# Patient Record
Sex: MALE | Race: WHITE | NOT HISPANIC OR LATINO
[De-identification: names, ages, dates, MRNs, and addresses within clinical notes are randomized per-mention and may not be internally consistent; named-entity substitution may affect disease eponyms.]

---

## 2017-04-23 ENCOUNTER — RESULT REVIEW (OUTPATIENT)
Age: 62
End: 2017-04-23

## 2017-06-29 ENCOUNTER — INPATIENT (INPATIENT)
Facility: HOSPITAL | Age: 62
LOS: 1 days | Discharge: HOME | DRG: 247 | End: 2017-07-01
Attending: INTERNAL MEDICINE | Admitting: INTERNAL MEDICINE
Payer: COMMERCIAL

## 2017-06-29 ENCOUNTER — TRANSCRIPTION ENCOUNTER (OUTPATIENT)
Age: 62
End: 2017-06-29

## 2017-06-29 VITALS
WEIGHT: 214.95 LBS | SYSTOLIC BLOOD PRESSURE: 142 MMHG | OXYGEN SATURATION: 98 % | DIASTOLIC BLOOD PRESSURE: 69 MMHG | TEMPERATURE: 98 F | HEIGHT: 69 IN | RESPIRATION RATE: 18 BRPM | HEART RATE: 63 BPM

## 2017-06-29 DIAGNOSIS — R07.89 OTHER CHEST PAIN: ICD-10-CM

## 2017-06-29 DIAGNOSIS — Z95.1 PRESENCE OF AORTOCORONARY BYPASS GRAFT: Chronic | ICD-10-CM

## 2017-06-29 PROCEDURE — 93010 ELECTROCARDIOGRAM REPORT: CPT

## 2017-06-29 PROCEDURE — 93567 NJX CAR CTH SPRVLV AORTGRPHY: CPT | Mod: GC

## 2017-06-29 PROCEDURE — 93459 L HRT ART/GRFT ANGIO: CPT | Mod: 26,59,GC

## 2017-06-29 PROCEDURE — 92928 PRQ TCAT PLMT NTRAC ST 1 LES: CPT | Mod: RC,GC

## 2017-06-29 RX ORDER — FOLIC ACID 0.8 MG
1 TABLET ORAL DAILY
Qty: 0 | Refills: 0 | Status: DISCONTINUED | OUTPATIENT
Start: 2017-06-29 | End: 2017-07-01

## 2017-06-29 RX ORDER — SODIUM CHLORIDE 9 MG/ML
3 INJECTION INTRAMUSCULAR; INTRAVENOUS; SUBCUTANEOUS EVERY 8 HOURS
Qty: 0 | Refills: 0 | Status: DISCONTINUED | OUTPATIENT
Start: 2017-06-29 | End: 2017-07-01

## 2017-06-29 RX ORDER — ASPIRIN/CALCIUM CARB/MAGNESIUM 324 MG
81 TABLET ORAL DAILY
Qty: 0 | Refills: 0 | Status: DISCONTINUED | OUTPATIENT
Start: 2017-06-29 | End: 2017-07-01

## 2017-06-29 RX ORDER — CLOPIDOGREL BISULFATE 75 MG/1
75 TABLET, FILM COATED ORAL DAILY
Qty: 0 | Refills: 0 | Status: DISCONTINUED | OUTPATIENT
Start: 2017-06-29 | End: 2017-07-01

## 2017-06-29 RX ORDER — METOPROLOL TARTRATE 50 MG
1 TABLET ORAL
Qty: 0 | Refills: 0 | COMMUNITY

## 2017-06-29 RX ORDER — METOPROLOL TARTRATE 50 MG
25 TABLET ORAL DAILY
Qty: 0 | Refills: 0 | Status: DISCONTINUED | OUTPATIENT
Start: 2017-06-29 | End: 2017-06-29

## 2017-06-29 RX ORDER — ATORVASTATIN CALCIUM 80 MG/1
80 TABLET, FILM COATED ORAL AT BEDTIME
Qty: 0 | Refills: 0 | Status: DISCONTINUED | OUTPATIENT
Start: 2017-06-29 | End: 2017-07-01

## 2017-06-29 RX ORDER — CLONAZEPAM 1 MG
0.5 TABLET ORAL EVERY 12 HOURS
Qty: 0 | Refills: 0 | Status: DISCONTINUED | OUTPATIENT
Start: 2017-06-29 | End: 2017-07-01

## 2017-06-29 RX ORDER — METOPROLOL TARTRATE 50 MG
25 TABLET ORAL DAILY
Qty: 0 | Refills: 0 | Status: DISCONTINUED | OUTPATIENT
Start: 2017-06-29 | End: 2017-07-01

## 2017-06-29 RX ORDER — ACETAMINOPHEN 500 MG
650 TABLET ORAL EVERY 6 HOURS
Qty: 0 | Refills: 0 | Status: DISCONTINUED | OUTPATIENT
Start: 2017-06-29 | End: 2017-07-01

## 2017-06-29 RX ORDER — TAMSULOSIN HYDROCHLORIDE 0.4 MG/1
0.4 CAPSULE ORAL AT BEDTIME
Qty: 0 | Refills: 0 | Status: DISCONTINUED | OUTPATIENT
Start: 2017-06-29 | End: 2017-07-01

## 2017-06-29 RX ADMIN — TAMSULOSIN HYDROCHLORIDE 0.4 MILLIGRAM(S): 0.4 CAPSULE ORAL at 21:22

## 2017-06-29 RX ADMIN — ATORVASTATIN CALCIUM 80 MILLIGRAM(S): 80 TABLET, FILM COATED ORAL at 21:22

## 2017-06-29 RX ADMIN — Medication 0.5 MILLIGRAM(S): at 21:22

## 2017-06-29 RX ADMIN — SODIUM CHLORIDE 3 MILLILITER(S): 9 INJECTION INTRAMUSCULAR; INTRAVENOUS; SUBCUTANEOUS at 21:22

## 2017-06-29 NOTE — DISCHARGE NOTE ADULT - CARE PLAN
Principal Discharge DX:	Coronary artery disease due to lipid rich plaque  Goal:	Pt remains chest pain free and understands post cath discharge instructions  Instructions for follow-up, activity and diet:	Do not stop you Aspirin or Plavix unless instructed to do so by your cardiologist.   No heavy lifting, strenuous activity, bending, straining, or unnecessary stair climbing for 2 weeks. No driving for 2 days. You may shower 24 hours following the procedure but avoid baths/swimming for 1 week. Check your groin site for bleeding and/or swelling daily following procedure and call your doctor immediately if it occurs or if you experience increased pain at the site. Follow up with your cardiologist in 1-2 weeks. You may call [facility] if you have any questions/concerns regarding your procedure [phone].   Lifestyle modification: include healthy habits to prevent stroke and future CAD  Low salt, low fat diet.   Weight management.   Take medications as prescribed.    No smoking.  Follow up with your cardiologist or primary doctor in 1- 2 weeks. Principal Discharge DX:	Coronary artery disease due to lipid rich plaque  Goal:	Pt remains chest pain free and understands post cath discharge instructions  Instructions for follow-up, activity and diet:	Do not stop you Aspirin or Plavix unless instructed to do so by your cardiologist.   No heavy lifting, strenuous activity, bending, straining, or unnecessary stair climbing for 2 weeks. No driving for 2 days. You may shower 24 hours following the procedure but avoid baths/swimming for 1 week. Check your groin site for bleeding and/or swelling daily following procedure and call your doctor immediately if it occurs or if you experience increased pain at the site. Follow up with your cardiologist in 1-2 weeks. You may call [facility] if you have any questions/concerns regarding your procedure [phone].   Lifestyle modification: include healthy habits to prevent stroke and future CAD  Low salt, low fat diet.   Weight management.   Take medications as prescribed.    No smoking.  Follow up with your cardiologist or primary doctor in 1- 2 weeks. Principal Discharge DX:	Coronary artery disease due to lipid rich plaque  Goal:	Pt remains chest pain free and understands post cath discharge instructions  Instructions for follow-up, activity and diet:	Do not stop you Aspirin or Plavix unless instructed to do so by your cardiologist.   No heavy lifting, strenuous activity, bending, straining, or unnecessary stair climbing for 2 weeks. No driving for 2 days. You may shower 24 hours following the procedure but avoid baths/swimming for 1 week. Check your groin site for bleeding and/or swelling daily following procedure and call your doctor immediately if it occurs or if you experience increased pain at the site. Follow up with your cardiologist in 1-2 weeks. You may call [facility] if you have any questions/concerns regarding your procedure [phone].   Lifestyle modification: include healthy habits to prevent stroke and future CAD  Low salt, low fat diet.   Weight management.   Take medications as prescribed.    No smoking.  Follow up with your cardiologist or primary doctor in 1- 2 weeks. Principal Discharge DX:	Coronary artery disease due to lipid rich plaque  Goal:	Pt remains chest pain free and understands post cath discharge instructions  Instructions for follow-up, activity and diet:	Do not stop you Aspirin or Plavix unless instructed to do so by your cardiologist.   No heavy lifting, strenuous activity, bending, straining, or unnecessary stair climbing for 2 weeks. No driving for 2 days. You may shower 24 hours following the procedure but avoid baths/swimming for 1 week. Check your groin site for bleeding and/or swelling daily following procedure and call your doctor immediately if it occurs or if you experience increased pain at the site. Follow up with your cardiologist in 1-2 weeks. You may call [facility] if you have any questions/concerns regarding your procedure [phone].   Lifestyle modification: include healthy habits to prevent stroke and future CAD  Low salt, low fat diet.   Weight management.   Take medications as prescribed.    No smoking.  Follow up with your cardiologist or primary doctor in 1- 2 weeks.  Secondary Diagnosis:	HLD (hyperlipidemia)  Goal:	Your LDL cholesterol will be less than 70mg/dL  Instructions for follow-up, activity and diet:	Continue with your cholesterol medications. Eat a heart healthy diet that is low in saturated fats and salt, and includes whole grains, fruits, vegetables and lean protein; exercise regularly (consult with your physician or cardiologist first); maintain a heart healthy weight. Continue to follow with your primary physician or cardiologist for treatment goals, continue medication, have liver function testing every 3 months as anti lipid medications can cause liver irritation. If you smoke - quit (A resource to help you stop smoking is the [facility] – phone number [phone].). Principal Discharge DX:	Coronary artery disease due to lipid rich plaque  Goal:	Pt remains chest pain free and understands post cath discharge instructions  Instructions for follow-up, activity and diet:	Do not stop you Aspirin or Plavix unless instructed to do so by your cardiologist.   No heavy lifting, strenuous activity, bending, straining, or unnecessary stair climbing for 2 weeks. No driving for 2 days. You may shower 24 hours following the procedure but avoid baths/swimming for 1 week. Check your groin site for bleeding and/or swelling daily following procedure and call your doctor immediately if it occurs or if you experience increased pain at the site. Follow up with your cardiologist in 1-2 weeks. You may call [facility] if you have any questions/concerns regarding your procedure [phone].   Lifestyle modification: include healthy habits to prevent stroke and future CAD  Low salt, low fat diet.   Weight management.   Take medications as prescribed.    No smoking.  Follow up with your cardiologist or primary doctor in 1- 2 weeks.  Secondary Diagnosis:	HLD (hyperlipidemia)  Goal:	Your LDL cholesterol will be less than 70mg/dL  Instructions for follow-up, activity and diet:	Continue with your cholesterol medications. Eat a heart healthy diet that is low in saturated fats and salt, and includes whole grains, fruits, vegetables and lean protein; exercise regularly (consult with your physician or cardiologist first); maintain a heart healthy weight. Continue to follow with your primary physician or cardiologist for treatment goals, continue medication, have liver function testing every 3 months as anti lipid medications can cause liver irritation. If you smoke - quit (A resource to help you stop smoking is the [facility] – phone number [phone].). Principal Discharge DX:	Coronary artery disease due to lipid rich plaque  Goal:	Pt remains chest pain free and understands post cath discharge instructions  Instructions for follow-up, activity and diet:	Do not stop you Aspirin or Plavix unless instructed to do so by your cardiologist.   No heavy lifting, strenuous activity, bending, straining, or unnecessary stair climbing for 2 weeks. No driving for 2 days. You may shower 24 hours following the procedure but avoid baths/swimming for 1 week. Check your groin site for bleeding and/or swelling daily following procedure and call your doctor immediately if it occurs or if you experience increased pain at the site. Follow up with your cardiologist in 1-2 weeks. You may call [facility] if you have any questions/concerns regarding your procedure [phone].   Lifestyle modification: include healthy habits to prevent stroke and future CAD  Low salt, low fat diet.   Weight management.   Take medications as prescribed.    No smoking.  Follow up with your cardiologist or primary doctor in 1- 2 weeks.  Secondary Diagnosis:	HLD (hyperlipidemia)  Goal:	Your LDL cholesterol will be less than 70mg/dL  Instructions for follow-up, activity and diet:	Continue with your cholesterol medications. Eat a heart healthy diet that is low in saturated fats and salt, and includes whole grains, fruits, vegetables and lean protein; exercise regularly (consult with your physician or cardiologist first); maintain a heart healthy weight. Continue to follow with your primary physician or cardiologist for treatment goals, continue medication, have liver function testing every 3 months as anti lipid medications can cause liver irritation. If you smoke - quit (A resource to help you stop smoking is the [facility] – phone number [phone].). Principal Discharge DX:	Coronary artery disease due to lipid rich plaque  Goal:	Pt remains chest pain free and understands post cath discharge instructions  Instructions for follow-up, activity and diet:	Do not stop you Aspirin or Plavix unless instructed to do so by your cardiologist.   No heavy lifting, strenuous activity, bending, straining, or unnecessary stair climbing for 2 weeks. No driving for 2 days. You may shower 24 hours following the procedure but avoid baths/swimming for 1 week. Check your groin site for bleeding and/or swelling daily following procedure and call your doctor immediately if it occurs or if you experience increased pain at the site. Follow up with your cardiologist in 1-2 weeks. You may call [facility] if you have any questions/concerns regarding your procedure [phone].   Lifestyle modification: include healthy habits to prevent stroke and future CAD  Low salt, low fat diet.   Weight management.   Take medications as prescribed.    No smoking.  Follow up with your cardiologist or primary doctor in 1- 2 weeks.  Secondary Diagnosis:	HLD (hyperlipidemia)  Goal:	Your LDL cholesterol will be less than 70mg/dL  Instructions for follow-up, activity and diet:	Continue with your cholesterol medications. Eat a heart healthy diet that is low in saturated fats and salt, and includes whole grains, fruits, vegetables and lean protein; exercise regularly (consult with your physician or cardiologist first); maintain a heart healthy weight. Continue to follow with your primary physician or cardiologist for treatment goals, continue medication, have liver function testing every 3 months as anti lipid medications can cause liver irritation. If you smoke - quit (A resource to help you stop smoking is the [facility] – phone number [phone].). Principal Discharge DX:	Coronary artery disease due to lipid rich plaque  Goal:	Pt remains chest pain free and understands post cath discharge instructions  Instructions for follow-up, activity and diet:	Do not stop you Aspirin or Plavix unless instructed to do so by your cardiologist.   No heavy lifting, strenuous activity, bending, straining, or unnecessary stair climbing for 2 weeks. No driving for 2 days. You may shower 24 hours following the procedure but avoid baths/swimming for 1 week. Check your groin site for bleeding and/or swelling daily following procedure and call your doctor immediately if it occurs or if you experience increased pain at the site. Follow up with your cardiologist in 1-2 weeks. You may call [facility] if you have any questions/concerns regarding your procedure [phone].   Lifestyle modification: include healthy habits to prevent stroke and future CAD  Low salt, low fat diet.   Weight management.   Take medications as prescribed.    No smoking.  Follow up with your cardiologist or primary doctor in 1- 2 weeks.  Secondary Diagnosis:	HLD (hyperlipidemia)  Goal:	Your LDL cholesterol will be less than 70mg/dL  Instructions for follow-up, activity and diet:	Continue with your cholesterol medications. Eat a heart healthy diet that is low in saturated fats and salt, and includes whole grains, fruits, vegetables and lean protein; exercise regularly (consult with your physician or cardiologist first); maintain a heart healthy weight. Continue to follow with your primary physician or cardiologist for treatment goals, continue medication, have liver function testing every 3 months as anti lipid medications can cause liver irritation. If you smoke - quit (A resource to help you stop smoking is the [facility] – phone number [phone].).

## 2017-06-29 NOTE — CHART NOTE - NSCHARTNOTEFT_GEN_A_CORE
Removal of Femoral Sheath    Pulses in the (right) lower extremity are (palpable/audible by doppler/absent). The patient was placed in the supine position. The insertion site was identified and the sutures were removed per protocol.  The 6 [place] femoral sheath was then removed. Direct pressure was applied for  _20_____ minutes.     Monitoring of the (right) groin and both lower extremities including neuro-vascular checks and vital signs every 15 minutes x 4, then every 30 minutes x 2, then every 1 hour was ordered.    Complications: none    Comments:    Addendum:

## 2017-06-29 NOTE — DISCHARGE NOTE ADULT - PLAN OF CARE
Pt remains chest pain free and understands post cath discharge instructions Do not stop you Aspirin or Plavix unless instructed to do so by your cardiologist.   No heavy lifting, strenuous activity, bending, straining, or unnecessary stair climbing for 2 weeks. No driving for 2 days. You may shower 24 hours following the procedure but avoid baths/swimming for 1 week. Check your groin site for bleeding and/or swelling daily following procedure and call your doctor immediately if it occurs or if you experience increased pain at the site. Follow up with your cardiologist in 1-2 weeks. You may call [facility] if you have any questions/concerns regarding your procedure [phone].   Lifestyle modification: include healthy habits to prevent stroke and future CAD  Low salt, low fat diet.   Weight management.   Take medications as prescribed.    No smoking.  Follow up with your cardiologist or primary doctor in 1- 2 weeks. Your LDL cholesterol will be less than 70mg/dL Continue with your cholesterol medications. Eat a heart healthy diet that is low in saturated fats and salt, and includes whole grains, fruits, vegetables and lean protein; exercise regularly (consult with your physician or cardiologist first); maintain a heart healthy weight. Continue to follow with your primary physician or cardiologist for treatment goals, continue medication, have liver function testing every 3 months as anti lipid medications can cause liver irritation. If you smoke - quit (A resource to help you stop smoking is the [facility] – phone number [phone].).

## 2017-06-29 NOTE — H&P CARDIOLOGY - HISTORY OF PRESENT ILLNESS
62 year old male pt with PMHX of CAD with stent, 5V CABG (2007) at [facility], MARTA not on machine presented to [facility] ER on 6/28 evening c/o 3/10 left sided chest pain radiated to left scapula. pt states he worked 2 days of gardening and errand for his car and came home around 7pm started feeling pain. Pt had elevated CPK (397) but had negative Troponin x3 with no EKG changes, ASA, Plavix given, now transferred here for cardiac cath. Pt denies chest pain or SOB currently.

## 2017-06-29 NOTE — DISCHARGE NOTE ADULT - ADDITIONAL INSTRUCTIONS
No heavy lifting or pushing/pulling with procedure arm for 2 weeks. No driving for 2 days. You may shower 24 hours following the procedure but avoid baths/swimming for 1 week. Check your wrist site for bleeding and/or swelling daily following procedure and call your doctor immediately if it occurs or if you experience increased pain at the site. Follow up with your cardiologist in 1-2 weeks. You may call [facility] if you have any questions/concerns regarding your procedure [phone]. DO NOT STOP ASPIRIN OR PLAVIX UNLESS INSTRUCTED BY CARDIOLOGIST.

## 2017-06-29 NOTE — DISCHARGE NOTE ADULT - PATIENT PORTAL LINK FT
“You can access the FollowHealth Patient Portal, offered by [facility], by registering with the following website: [email or web address]”

## 2017-06-29 NOTE — DISCHARGE NOTE ADULT - MEDICATION SUMMARY - MEDICATIONS TO TAKE
I will START or STAY ON the medications listed below when I get home from the hospital:    aspirin 81 mg oral tablet  -- 1 tab(s) by mouth once a day  -- Indication: For CAD (coronary artery disease)    tamsulosin 0.4 mg oral capsule  -- 1 cap(s) by mouth once a day  -- Indication: For Home med    clonazePAM 0.5 mg oral tablet  -- 1 tab(s) by mouth 2 times a day  -- Indication: For Home med    Zetia 10 mg oral tablet  -- 1 tab(s) by mouth once a day  -- Indication: For HLD (hyperlipidemia)    pravastatin 40 mg oral tablet  -- 1 tab(s) by mouth once a day  -- Indication: For HLD (hyperlipidemia)    clopidogrel 75 mg oral tablet  -- 1 tab(s) by mouth once a day  -- Indication: For CAD (coronary artery disease)    Toprol-XL 25 mg oral tablet, extended release  -- 1 tab(s) by mouth once a day  -- Indication: For Htn    CoQ10 300 mg oral capsule  -- 1 cap(s) by mouth once a day  -- Indication: For Home med    Fish Oil 1000 mg oral capsule  -- 1 cap(s) by mouth once a day  -- Indication: For Home med    folic acid 1 mg oral tablet  -- 1 tab(s) by mouth once a day  -- Indication: For Home med I will START or STAY ON the medications listed below when I get home from the hospital:    aspirin 81 mg oral tablet  -- 1 tab(s) by mouth once a day  -- Indication: For To prevent stent from closing     tamsulosin 0.4 mg oral capsule  -- 1 cap(s) by mouth once a day  -- Indication: For Home med    clonazePAM 0.5 mg oral tablet  -- 1 tab(s) by mouth 2 times a day  -- Indication: For Home med    pravastatin 40 mg oral tablet  -- 1 tab(s) by mouth once a day  -- Indication: For HLD (hyperlipidemia)    Zetia 10 mg oral tablet  -- 1 tab(s) by mouth once a day  -- Indication: For HLD (hyperlipidemia)    clopidogrel 75 mg oral tablet  -- 1 tab(s) by mouth once a day  -- Indication: For To prevent stent from closing     Toprol-XL 25 mg oral tablet, extended release  -- 1 tab(s) by mouth once a day  -- Indication: For Htn    CoQ10 300 mg oral capsule  -- 1 cap(s) by mouth once a day  -- Indication: For Supplement     Fish Oil 1000 mg oral capsule  -- 1 cap(s) by mouth once a day  -- Indication: For Supplement     folic acid 1 mg oral tablet  -- 1 tab(s) by mouth once a day  -- Indication: For Supplement

## 2017-06-29 NOTE — DISCHARGE NOTE ADULT - HOSPITAL COURSE
62 year old male pt with PMHX of CAD with stent, 5V CABG (2007) at [facility], MARTA not on machine presented to [facility] ER on 6/28 evening c/o 3/10 left sided chest pain radiated to left scapula. pt states he worked 2 days of gardening and errand for his car and came home around 7pm started feeling pain. Pt had elevated CPK (397) but had negative Troponin x3 with no EKG changes, ASA, Plavix given, now transferred here for cardiac cath. Pt denies chest pain or SOB currently.   Pt s/p PCI: MICHELL x1 to RCA via R groin. Pt tolerated procedure well and overnight remained uneventful. No c/o chest pain or SOB. Pt is hemodynamically stable, EKG and all lab results reviewed. Insertion/incision site benign, no bleeding or hematoma, and cath site dressing changed. 62 year old male pt with PMHX of CAD with stent, 5V CABG (2007) at [facility], MARTA not on machine presented to [facility] ER on 6/28 evening c/o 3/10 left sided chest pain radiated to left scapula. pt states he worked 2 days of gardening and errand for his car and came home around 7pm started feeling pain. Pt had elevated CPK (397) but had negative Troponin x3 with no EKG changes, ASA, Plavix given, now transferred here for cardiac cath. Pt s/p cath: severe LM disease, severe oLAD disease, oRCA 90%, LIMA to LAD patent, SVG to RCA patent, SVG to %. S/p MICHELL x1 to RCA on 6/29 via right groin access, staged PCI in LM on 6/30 via right radial access.   Pt tolerated procedure well and overnight remained uneventful. No c/o chest pain or SOB. Pt is hemodynamically stable, EKG and all lab results reviewed. Insertion/incision site benign, no bleeding or hematoma, and cath site dressing changed.

## 2017-06-29 NOTE — DISCHARGE NOTE ADULT - CARE PROVIDER_API CALL
Jules Zavala  [address] [facility] [address] [facility] [address]  [place] [address]  Phone: [phone]  Fax: (       -

## 2017-06-29 NOTE — DISCHARGE NOTE ADULT - PROVIDER TOKENS
FREE:[LAST:[Lucas],FIRST:[Jules],PHONE:[phone]],FAX:[(   [phone]    -],ADDRESS:[[address] [place] [address] [address] [place], [place] [id number]]]

## 2017-06-29 NOTE — H&P CARDIOLOGY - PMH
CAD (coronary artery disease)    HLD (hyperlipidemia)    MARTA (obstructive sleep apnea)    Stented coronary artery

## 2017-06-30 LAB
ANION GAP SERPL CALC-SCNC: 11 MMOL/L — SIGNIFICANT CHANGE UP (ref 5–17)
BUN SERPL-MCNC: 12 MG/DL — SIGNIFICANT CHANGE UP (ref 7–23)
CALCIUM SERPL-MCNC: 8.8 MG/DL — SIGNIFICANT CHANGE UP (ref 8.4–10.5)
CHLORIDE SERPL-SCNC: 103 MMOL/L — SIGNIFICANT CHANGE UP (ref 96–108)
CO2 SERPL-SCNC: 25 MMOL/L — SIGNIFICANT CHANGE UP (ref 22–31)
CREAT SERPL-MCNC: 1.08 MG/DL — SIGNIFICANT CHANGE UP (ref 0.5–1.3)
GLUCOSE SERPL-MCNC: 123 MG/DL — HIGH (ref 70–99)
HCT VFR BLD CALC: 39.5 % — SIGNIFICANT CHANGE UP (ref 39–50)
HGB BLD-MCNC: 14 G/DL — SIGNIFICANT CHANGE UP (ref 13–17)
MCHC RBC-ENTMCNC: 32.3 PG — SIGNIFICANT CHANGE UP (ref 27–34)
MCHC RBC-ENTMCNC: 35.5 GM/DL — SIGNIFICANT CHANGE UP (ref 32–36)
MCV RBC AUTO: 90.9 FL — SIGNIFICANT CHANGE UP (ref 80–100)
PLATELET # BLD AUTO: 230 K/UL — SIGNIFICANT CHANGE UP (ref 150–400)
POTASSIUM SERPL-MCNC: 3.8 MMOL/L — SIGNIFICANT CHANGE UP (ref 3.5–5.3)
POTASSIUM SERPL-SCNC: 3.8 MMOL/L — SIGNIFICANT CHANGE UP (ref 3.5–5.3)
RBC # BLD: 4.34 M/UL — SIGNIFICANT CHANGE UP (ref 4.2–5.8)
RBC # FLD: 11.9 % — SIGNIFICANT CHANGE UP (ref 10.3–14.5)
SODIUM SERPL-SCNC: 139 MMOL/L — SIGNIFICANT CHANGE UP (ref 135–145)
WBC # BLD: 7.7 K/UL — SIGNIFICANT CHANGE UP (ref 3.8–10.5)
WBC # FLD AUTO: 7.7 K/UL — SIGNIFICANT CHANGE UP (ref 3.8–10.5)

## 2017-06-30 PROCEDURE — 93010 ELECTROCARDIOGRAM REPORT: CPT

## 2017-06-30 PROCEDURE — 92928 PRQ TCAT PLMT NTRAC ST 1 LES: CPT | Mod: LM

## 2017-06-30 RX ORDER — CLOPIDOGREL BISULFATE 75 MG/1
1 TABLET, FILM COATED ORAL
Qty: 30 | Refills: 11
Start: 2017-06-30 | End: 2018-06-24

## 2017-06-30 RX ORDER — ASPIRIN/CALCIUM CARB/MAGNESIUM 324 MG
1 TABLET ORAL
Qty: 30 | Refills: 11
Start: 2017-06-30 | End: 2018-06-24

## 2017-06-30 RX ADMIN — Medication 0.5 MILLIGRAM(S): at 18:05

## 2017-06-30 RX ADMIN — Medication 1 MILLIGRAM(S): at 07:27

## 2017-06-30 RX ADMIN — Medication 81 MILLIGRAM(S): at 05:38

## 2017-06-30 RX ADMIN — CLOPIDOGREL BISULFATE 75 MILLIGRAM(S): 75 TABLET, FILM COATED ORAL at 05:38

## 2017-06-30 RX ADMIN — Medication 0.5 MILLIGRAM(S): at 07:27

## 2017-06-30 RX ADMIN — TAMSULOSIN HYDROCHLORIDE 0.4 MILLIGRAM(S): 0.4 CAPSULE ORAL at 22:08

## 2017-06-30 RX ADMIN — SODIUM CHLORIDE 3 MILLILITER(S): 9 INJECTION INTRAMUSCULAR; INTRAVENOUS; SUBCUTANEOUS at 13:00

## 2017-06-30 RX ADMIN — SODIUM CHLORIDE 3 MILLILITER(S): 9 INJECTION INTRAMUSCULAR; INTRAVENOUS; SUBCUTANEOUS at 05:37

## 2017-06-30 RX ADMIN — SODIUM CHLORIDE 3 MILLILITER(S): 9 INJECTION INTRAMUSCULAR; INTRAVENOUS; SUBCUTANEOUS at 22:08

## 2017-06-30 RX ADMIN — Medication 25 MILLIGRAM(S): at 05:38

## 2017-06-30 RX ADMIN — ATORVASTATIN CALCIUM 80 MILLIGRAM(S): 80 TABLET, FILM COATED ORAL at 22:07

## 2017-06-30 NOTE — CHART NOTE - NSCHARTNOTEFT_GEN_A_CORE
Patient is a 62y old  Male who presents with a chief complaint of cardiac cath (2017 21:31)    S/P PCI: MICHELL x1 to RCA via R groin.     Allergies    No Known Allergies    Intolerances        Medications:  sodium chloride 0.9% lock flush 3 milliLiter(s) IV Push every 8 hours  aspirin enteric coated 81 milliGRAM(s) Oral daily  tamsulosin 0.4 milliGRAM(s) Oral at bedtime  clonazePAM Tablet 0.5 milliGRAM(s) Oral every 12 hours  atorvastatin 80 milliGRAM(s) Oral at bedtime  clopidogrel Tablet 75 milliGRAM(s) Oral daily  folic acid 1 milliGRAM(s) Oral daily  metoprolol succinate ER 25 milliGRAM(s) Oral daily  acetaminophen   Tablet. 650 milliGRAM(s) Oral every 6 hours PRN      Vitals:  T(C): 36.8 (17 @ 20:30), Max: 36.9 (17 @ 18:05)  HR: 77 (17 @ 02:00) (61 - 79)  BP: 121/64 (17 @ 02:00) (98/52 - 142/69)  BP(mean): 93 (17 @ 18:05) (93 - 93)  RR: 17 (17 @ 02:00) (16 - 18)  SpO2: 96% (17 @ 02:00) (92% - 98%)  Wt(kg): --  Daily Height in cm: 175.26 (2017 18:05)    Daily Weight in k.5 (2017 18:05)  I&O's Summary      Physical Exam:  Appearance: Pt in NAD, non-toxic  Cardiovascular: S1 S2  Procedural Access Site: No bleeding, No hematoma, Non-tender to palpation, 2+ pulse  Respiratory: Clear to auscultation bilaterally  Gastrointestinal: Soft, NT/ND, Bowel Sounds +                          14.0   7.7   )-----------( 230      ( 2017 00:09 )             39.5         139  |  103  |  12  ----------------------------<  123<H>  3.8   |  25  |  1.08    Ca    8.8      2017 00:09      ECG: SR at HR 60    Interpretation of Telemetry: SR at HR 60-80    Cath: S/P PCI: MICHELL x1 to RCA via R groin. Pt tolerated procedure well and overnight remained uneventful. No c/o chest pain or SOB. Pt is hemodynamically stable, EKG and all lab results reviewed. Insertion/incision site benign, no bleeding or hematoma, and cath site dressing changed.   Plan to have staged PCI today.  Cont assess and monitor.

## 2017-07-01 VITALS
RESPIRATION RATE: 17 BRPM | SYSTOLIC BLOOD PRESSURE: 114 MMHG | OXYGEN SATURATION: 97 % | DIASTOLIC BLOOD PRESSURE: 61 MMHG | HEART RATE: 79 BPM

## 2017-07-01 LAB
ANION GAP SERPL CALC-SCNC: 14 MMOL/L — SIGNIFICANT CHANGE UP (ref 5–17)
BUN SERPL-MCNC: 16 MG/DL — SIGNIFICANT CHANGE UP (ref 7–23)
CALCIUM SERPL-MCNC: 9.1 MG/DL — SIGNIFICANT CHANGE UP (ref 8.4–10.5)
CHLORIDE SERPL-SCNC: 102 MMOL/L — SIGNIFICANT CHANGE UP (ref 96–108)
CO2 SERPL-SCNC: 22 MMOL/L — SIGNIFICANT CHANGE UP (ref 22–31)
CREAT SERPL-MCNC: 1.1 MG/DL — SIGNIFICANT CHANGE UP (ref 0.5–1.3)
GLUCOSE SERPL-MCNC: 105 MG/DL — HIGH (ref 70–99)
HCT VFR BLD CALC: 42 % — SIGNIFICANT CHANGE UP (ref 39–50)
HGB BLD-MCNC: 14 G/DL — SIGNIFICANT CHANGE UP (ref 13–17)
MCHC RBC-ENTMCNC: 30.4 PG — SIGNIFICANT CHANGE UP (ref 27–34)
MCHC RBC-ENTMCNC: 33.3 GM/DL — SIGNIFICANT CHANGE UP (ref 32–36)
MCV RBC AUTO: 91.2 FL — SIGNIFICANT CHANGE UP (ref 80–100)
PLATELET # BLD AUTO: 234 K/UL — SIGNIFICANT CHANGE UP (ref 150–400)
POTASSIUM SERPL-MCNC: 4 MMOL/L — SIGNIFICANT CHANGE UP (ref 3.5–5.3)
POTASSIUM SERPL-SCNC: 4 MMOL/L — SIGNIFICANT CHANGE UP (ref 3.5–5.3)
RBC # BLD: 4.6 M/UL — SIGNIFICANT CHANGE UP (ref 4.2–5.8)
RBC # FLD: 11.8 % — SIGNIFICANT CHANGE UP (ref 10.3–14.5)
SODIUM SERPL-SCNC: 138 MMOL/L — SIGNIFICANT CHANGE UP (ref 135–145)
WBC # BLD: 9.1 K/UL — SIGNIFICANT CHANGE UP (ref 3.8–10.5)
WBC # FLD AUTO: 9.1 K/UL — SIGNIFICANT CHANGE UP (ref 3.8–10.5)

## 2017-07-01 PROCEDURE — 85027 COMPLETE CBC AUTOMATED: CPT

## 2017-07-01 PROCEDURE — C9600: CPT | Mod: RC

## 2017-07-01 PROCEDURE — C1725: CPT

## 2017-07-01 PROCEDURE — C1769: CPT

## 2017-07-01 PROCEDURE — 93459 L HRT ART/GRFT ANGIO: CPT | Mod: 59

## 2017-07-01 PROCEDURE — C1887: CPT

## 2017-07-01 PROCEDURE — 93567 NJX CAR CTH SPRVLV AORTGRPHY: CPT

## 2017-07-01 PROCEDURE — 80048 BASIC METABOLIC PNL TOTAL CA: CPT

## 2017-07-01 PROCEDURE — 93010 ELECTROCARDIOGRAM REPORT: CPT

## 2017-07-01 PROCEDURE — C1894: CPT

## 2017-07-01 PROCEDURE — C1874: CPT

## 2017-07-01 PROCEDURE — 93005 ELECTROCARDIOGRAM TRACING: CPT

## 2017-07-01 RX ADMIN — Medication 0.5 MILLIGRAM(S): at 05:33

## 2017-07-01 RX ADMIN — CLOPIDOGREL BISULFATE 75 MILLIGRAM(S): 75 TABLET, FILM COATED ORAL at 05:33

## 2017-07-01 RX ADMIN — Medication 1 MILLIGRAM(S): at 05:33

## 2017-07-01 RX ADMIN — Medication 81 MILLIGRAM(S): at 05:33

## 2017-07-01 RX ADMIN — Medication 25 MILLIGRAM(S): at 05:33

## 2017-07-01 RX ADMIN — SODIUM CHLORIDE 3 MILLILITER(S): 9 INJECTION INTRAMUSCULAR; INTRAVENOUS; SUBCUTANEOUS at 05:35

## 2018-01-09 PROBLEM — E78.5 HYPERLIPIDEMIA, UNSPECIFIED: Chronic | Status: ACTIVE | Noted: 2017-06-29

## 2018-01-09 PROBLEM — I25.10 ATHEROSCLEROTIC HEART DISEASE OF NATIVE CORONARY ARTERY WITHOUT ANGINA PECTORIS: Chronic | Status: ACTIVE | Noted: 2017-06-29

## 2018-01-09 PROBLEM — G47.33 OBSTRUCTIVE SLEEP APNEA (ADULT) (PEDIATRIC): Chronic | Status: ACTIVE | Noted: 2017-06-29

## 2018-01-09 PROBLEM — Z95.5 PRESENCE OF CORONARY ANGIOPLASTY IMPLANT AND GRAFT: Chronic | Status: ACTIVE | Noted: 2017-06-29

## 2018-01-25 ENCOUNTER — APPOINTMENT (OUTPATIENT)
Dept: INTERNAL MEDICINE | Facility: CLINIC | Age: 63
End: 2018-01-25

## 2018-02-07 ENCOUNTER — APPOINTMENT (OUTPATIENT)
Dept: INTERNAL MEDICINE | Facility: CLINIC | Age: 63
End: 2018-02-07
Payer: COMMERCIAL

## 2018-02-07 VITALS
SYSTOLIC BLOOD PRESSURE: 124 MMHG | DIASTOLIC BLOOD PRESSURE: 74 MMHG | WEIGHT: 210 LBS | RESPIRATION RATE: 15 BRPM | HEIGHT: 67 IN | HEART RATE: 74 BPM | BODY MASS INDEX: 32.96 KG/M2

## 2018-02-07 DIAGNOSIS — Z82.49 FAMILY HISTORY OF ISCHEMIC HEART DISEASE AND OTHER DISEASES OF THE CIRCULATORY SYSTEM: ICD-10-CM

## 2018-02-07 DIAGNOSIS — Z80.8 FAMILY HISTORY OF MALIGNANT NEOPLASM OF OTHER ORGANS OR SYSTEMS: ICD-10-CM

## 2018-02-07 DIAGNOSIS — Z78.9 OTHER SPECIFIED HEALTH STATUS: ICD-10-CM

## 2018-02-07 PROCEDURE — 99386 PREV VISIT NEW AGE 40-64: CPT

## 2018-02-07 PROCEDURE — 99213 OFFICE O/P EST LOW 20 MIN: CPT | Mod: 25

## 2018-03-06 ENCOUNTER — LABORATORY RESULT (OUTPATIENT)
Age: 63
End: 2018-03-06

## 2018-03-07 ENCOUNTER — APPOINTMENT (OUTPATIENT)
Dept: INTERNAL MEDICINE | Facility: CLINIC | Age: 63
End: 2018-03-07
Payer: COMMERCIAL

## 2018-03-07 ENCOUNTER — NON-APPOINTMENT (OUTPATIENT)
Age: 63
End: 2018-03-07

## 2018-03-07 VITALS — RESPIRATION RATE: 14 BRPM | HEART RATE: 78 BPM

## 2018-03-07 VITALS
BODY MASS INDEX: 32.96 KG/M2 | HEIGHT: 67 IN | WEIGHT: 210 LBS | DIASTOLIC BLOOD PRESSURE: 78 MMHG | SYSTOLIC BLOOD PRESSURE: 124 MMHG

## 2018-03-07 DIAGNOSIS — Z95.1 PRESENCE OF AORTOCORONARY BYPASS GRAFT: ICD-10-CM

## 2018-03-07 DIAGNOSIS — R00.2 PALPITATIONS: ICD-10-CM

## 2018-03-07 PROCEDURE — 93000 ELECTROCARDIOGRAM COMPLETE: CPT

## 2018-03-07 PROCEDURE — 99215 OFFICE O/P EST HI 40 MIN: CPT

## 2018-03-07 RX ORDER — CLOPIDOGREL BISULFATE 75 MG/1
75 TABLET, FILM COATED ORAL
Refills: 0 | Status: DISCONTINUED | COMMUNITY
End: 2018-03-07

## 2018-03-07 RX ORDER — TAMSULOSIN HYDROCHLORIDE 0.4 MG/1
0.4 CAPSULE ORAL
Qty: 180 | Refills: 1 | Status: DISCONTINUED | COMMUNITY
Start: 2018-02-07 | End: 2018-03-07

## 2018-03-12 ENCOUNTER — MEDICATION RENEWAL (OUTPATIENT)
Age: 63
End: 2018-03-12

## 2018-03-13 LAB
25(OH)D3 SERPL-MCNC: 14 NG/ML
ALBUMIN SERPL ELPH-MCNC: 4.4 G/DL
ALP BLD-CCNC: 65 U/L
ALT SERPL-CCNC: 15 U/L
ANION GAP SERPL CALC-SCNC: 16 MMOL/L
APPEARANCE: ABNORMAL
AST SERPL-CCNC: 21 U/L
BASOPHILS # BLD AUTO: 0.03 K/UL
BASOPHILS NFR BLD AUTO: 0.4 %
BILIRUB SERPL-MCNC: 0.6 MG/DL
BILIRUBIN URINE: NEGATIVE
BLOOD URINE: NEGATIVE
BUN SERPL-MCNC: 15 MG/DL
CALCIUM SERPL-MCNC: 9.5 MG/DL
CHLORIDE SERPL-SCNC: 99 MMOL/L
CHOLEST SERPL-MCNC: 150 MG/DL
CHOLEST/HDLC SERPL: 2.9 RATIO
CO2 SERPL-SCNC: 24 MMOL/L
COLOR: YELLOW
CREAT SERPL-MCNC: 1.11 MG/DL
CRP SERPL HS-MCNC: 1.1 MG/L
EOSINOPHIL # BLD AUTO: 0.27 K/UL
EOSINOPHIL NFR BLD AUTO: 3.7 %
GLUCOSE BS SERPL-MCNC: 110 MG/DL
GLUCOSE QUALITATIVE U: NEGATIVE MG/DL
GLUCOSE SERPL-MCNC: 116 MG/DL
HBA1C MFR BLD HPLC: 6 %
HCT VFR BLD CALC: 42.5 %
HDLC SERPL-MCNC: 52 MG/DL
HGB BLD-MCNC: 14.7 G/DL
IMM GRANULOCYTES NFR BLD AUTO: 0.1 %
KETONES URINE: NEGATIVE
LDLC SERPL CALC-MCNC: 78 MG/DL
LEUKOCYTE ESTERASE URINE: NEGATIVE
LYMPHOCYTES # BLD AUTO: 2.24 K/UL
LYMPHOCYTES NFR BLD AUTO: 30.7 %
MAN DIFF?: NORMAL
MCHC RBC-ENTMCNC: 30.1 PG
MCHC RBC-ENTMCNC: 34.6 GM/DL
MCV RBC AUTO: 86.9 FL
MONOCYTES # BLD AUTO: 0.49 K/UL
MONOCYTES NFR BLD AUTO: 6.7 %
NEUTROPHILS # BLD AUTO: 4.26 K/UL
NEUTROPHILS NFR BLD AUTO: 58.4 %
NITRITE URINE: NEGATIVE
PH URINE: 5.5
PLATELET # BLD AUTO: 273 K/UL
POTASSIUM SERPL-SCNC: 4.5 MMOL/L
PROT SERPL-MCNC: 7.9 G/DL
PROTEIN URINE: NEGATIVE MG/DL
PSA FREE FLD-MCNC: 39.6
PSA FREE SERPL-MCNC: 0.36 NG/ML
PSA SERPL-MCNC: 0.91 NG/ML
RBC # BLD: 4.89 M/UL
RBC # FLD: 13.4 %
SODIUM SERPL-SCNC: 139 MMOL/L
SPECIFIC GRAVITY URINE: 1.02
T4 FREE SERPL-MCNC: 1.1 NG/DL
TRIGL SERPL-MCNC: 99 MG/DL
TSH SERPL-ACNC: 2.26 UIU/ML
UROBILINOGEN URINE: NEGATIVE MG/DL
VIT B12 SERPL-MCNC: 169 PG/ML
WBC # FLD AUTO: 7.3 K/UL

## 2018-03-15 ENCOUNTER — APPOINTMENT (OUTPATIENT)
Dept: CARDIOLOGY | Facility: CLINIC | Age: 63
End: 2018-03-15

## 2018-04-26 ENCOUNTER — APPOINTMENT (OUTPATIENT)
Dept: INTERNAL MEDICINE | Facility: CLINIC | Age: 63
End: 2018-04-26
Payer: COMMERCIAL

## 2018-04-26 VITALS
HEART RATE: 80 BPM | DIASTOLIC BLOOD PRESSURE: 80 MMHG | HEIGHT: 67 IN | WEIGHT: 210 LBS | SYSTOLIC BLOOD PRESSURE: 128 MMHG | BODY MASS INDEX: 32.96 KG/M2 | RESPIRATION RATE: 15 BRPM

## 2018-04-26 PROCEDURE — 99214 OFFICE O/P EST MOD 30 MIN: CPT

## 2018-05-29 ENCOUNTER — APPOINTMENT (OUTPATIENT)
Dept: ORTHOPEDIC SURGERY | Facility: CLINIC | Age: 63
End: 2018-05-29
Payer: COMMERCIAL

## 2018-06-19 ENCOUNTER — APPOINTMENT (OUTPATIENT)
Dept: ORTHOPEDIC SURGERY | Facility: CLINIC | Age: 63
End: 2018-06-19
Payer: COMMERCIAL

## 2018-06-19 VITALS
BODY MASS INDEX: 32.33 KG/M2 | HEART RATE: 55 BPM | WEIGHT: 206 LBS | DIASTOLIC BLOOD PRESSURE: 55 MMHG | SYSTOLIC BLOOD PRESSURE: 98 MMHG | HEIGHT: 67 IN

## 2018-06-19 DIAGNOSIS — Z80.9 FAMILY HISTORY OF MALIGNANT NEOPLASM, UNSPECIFIED: ICD-10-CM

## 2018-06-19 DIAGNOSIS — Z86.79 PERSONAL HISTORY OF OTHER DISEASES OF THE CIRCULATORY SYSTEM: ICD-10-CM

## 2018-06-19 DIAGNOSIS — Z82.61 FAMILY HISTORY OF ARTHRITIS: ICD-10-CM

## 2018-06-19 DIAGNOSIS — Z86.39 PERSONAL HISTORY OF OTHER ENDOCRINE, NUTRITIONAL AND METABOLIC DISEASE: ICD-10-CM

## 2018-06-19 PROCEDURE — 99204 OFFICE O/P NEW MOD 45 MIN: CPT | Mod: 25

## 2018-06-19 PROCEDURE — 20605 DRAIN/INJ JOINT/BURSA W/O US: CPT | Mod: RT

## 2018-06-19 PROCEDURE — 73610 X-RAY EXAM OF ANKLE: CPT | Mod: RT

## 2018-06-19 RX ORDER — DIPHENHYDRAMINE HCL 12.5 MG/5ML
25 MCG LIQUID ORAL
Refills: 0 | Status: ACTIVE | COMMUNITY

## 2018-06-19 RX ORDER — OMEGA-3/DHA/EPA/FISH OIL 300-1000MG
1000 CAPSULE ORAL
Refills: 0 | Status: ACTIVE | COMMUNITY

## 2018-06-19 RX ORDER — NEOMYCIN/BACITRACIN/POLYMYXINB 3.5-400-5K
OINTMENT (GRAM) TOPICAL
Refills: 0 | Status: ACTIVE | COMMUNITY

## 2018-06-19 RX ORDER — OMEPRAZOLE 20 MG/1
20 CAPSULE, DELAYED RELEASE ORAL
Qty: 90 | Refills: 1 | Status: DISCONTINUED | COMMUNITY
Start: 2018-03-07 | End: 2018-06-19

## 2018-06-19 RX ORDER — UBIDECARENONE 200 MG
200 CAPSULE ORAL
Refills: 0 | Status: ACTIVE | COMMUNITY

## 2018-06-19 RX ORDER — ASPIRIN 81 MG
81 TABLET, DELAYED RELEASE (ENTERIC COATED) ORAL
Refills: 0 | Status: ACTIVE | COMMUNITY

## 2018-07-11 ENCOUNTER — MEDICATION RENEWAL (OUTPATIENT)
Age: 63
End: 2018-07-11

## 2018-08-22 ENCOUNTER — APPOINTMENT (OUTPATIENT)
Dept: INTERNAL MEDICINE | Facility: CLINIC | Age: 63
End: 2018-08-22
Payer: COMMERCIAL

## 2018-08-22 VITALS
HEART RATE: 56 BPM | HEIGHT: 67 IN | BODY MASS INDEX: 31.71 KG/M2 | WEIGHT: 202 LBS | SYSTOLIC BLOOD PRESSURE: 122 MMHG | DIASTOLIC BLOOD PRESSURE: 74 MMHG | RESPIRATION RATE: 16 BRPM

## 2018-08-22 PROCEDURE — 99215 OFFICE O/P EST HI 40 MIN: CPT

## 2018-08-22 NOTE — ASSESSMENT
[FreeTextEntry1] : Physical exam shows a well-developed man in no acute distress blood pressure was 122/74 pulse 56 respirations 16 5 foot 7 inches 202 pounds BMI of 31.64 HEENT was unremarkable chest was clear to auscultation and percussion cardiovascular showed normal S1-S2 without extra sounds or murmurs abdomen was soft and nontender extremities show no clubbing cyanosis or edema neurologic exam is nonfocal have helped and set up an appointment with infectious disease physician who specializes in [facility] hopefully this physician will inform him of the immunizations he would be recommended and the ramifications of these injections to his cardiovascular system.Patient was encouraged to continue using CPAP for his sleep apnea no change in medications once were just done in March of 2018

## 2018-08-22 NOTE — REVIEW OF SYSTEMS
[Nocturia] : nocturia [Fever] : no fever [Chills] : no chills [Fatigue] : no fatigue [Hot Flashes] : no hot flashes [Night Sweats] : no night sweats [Recent Change In Weight] : ~T no recent weight change [Discharge] : no discharge [Pain] : no pain [Redness] : no redness [Dryness] : no dryness [Vision Problems] : no vision problems [Itching] : no itching [Earache] : no earache [Hearing Loss] : no hearing loss [Nosebleeds] : no nosebleeds [Postnasal Drip] : no postnasal drip [Nasal Discharge] : no nasal discharge [Sore Throat] : no sore throat [Hoarseness] : no hoarseness [Chest Pain] : no chest pain [Palpitations] : no palpitations [Claudication] : no  leg claudication [Lower Ext Edema] : no lower extremity edema [Orthopena] : no orthopnea [Paroysmal Nocturnal Dyspnea] : no paroysmal nocturnal dyspnea [Shortness Of Breath] : no shortness of breath [Wheezing] : no wheezing [Cough] : no cough [Dyspnea on Exertion] : not dyspnea on exertion [Abdominal Pain] : no abdominal pain [Nausea] : no nausea [Constipation] : no constipation [Diarrhea] : no diarrhea [Vomiting] : no vomiting [Heartburn] : no heartburn [Melena] : no melena [Dysuria] : no dysuria [Incontinence] : no incontinence [Hesitancy] : no hesitancy [Hematuria] : no hematuria [Frequency] : no frequency [Impotence] : no impotency [Poor Libido] : libido not poor [Joint Pain] : no joint pain [Joint Stiffness] : no joint stiffness [Muscle Pain] : no muscle pain [Muscle Weakness] : no muscle weakness [Back Pain] : no back pain [Joint Swelling] : no joint swelling [Mole Changes] : no mole changes [Nail Changes] : no nail changes [Hair Changes] : no hair changes [Skin Rash] : no skin rash [Headache] : no headache [Dizziness] : no dizziness [Fainting] : no fainting [Unsteady Walk] : no ataxia [Memory Loss] : no memory loss [Suicidal] : not suicidal [Insomnia] : no insomnia [Anxiety] : no anxiety [Depression] : no depression [Easy Bleeding] : no easy bleeding

## 2018-08-22 NOTE — HISTORY OF PRESENT ILLNESS
[FreeTextEntry1] : Comes to the office for followup to review his medications and discuss his overall health concerns dealing with his possible trip to [place] and what immunizations would be recommended and whether they have any effects on his cardiovascular system or medications [de-identified] : 63-year-old man with a history of coronary artery disease status post bypass and stenting of multiple vessels sleep apnea GERD BPH and anxiety comes to this office for followup his major concern about a possible trip to [place] in February 2019 and wishes to discuss immunization and cardiac risk involved currently denies chest pain shortness of breath exertional dyspnea lightheadedness palpitations dizziness vertigo or syncope he said no temperature chills sweats cough wheezing sinus congestion sore throat dysuria denied abdominal pain nausea vomiting diarrhea constipation rectal bleeding or melena his heartburn has been minimal his appetite has been good and his weight stable

## 2018-08-22 NOTE — HEALTH RISK ASSESSMENT
[No falls in past year] : Patient reported no falls in the past year [0] : 2) Feeling down, depressed, or hopeless: Not at all (0) [[id number]] : 0

## 2018-09-06 ENCOUNTER — MEDICATION RENEWAL (OUTPATIENT)
Age: 63
End: 2018-09-06

## 2018-10-24 ENCOUNTER — APPOINTMENT (OUTPATIENT)
Dept: INTERNAL MEDICINE | Facility: CLINIC | Age: 63
End: 2018-10-24
Payer: COMMERCIAL

## 2018-10-24 ENCOUNTER — RX RENEWAL (OUTPATIENT)
Age: 63
End: 2018-10-24

## 2018-10-24 VITALS
DIASTOLIC BLOOD PRESSURE: 72 MMHG | TEMPERATURE: 98.3 F | OXYGEN SATURATION: 99 % | BODY MASS INDEX: 31.39 KG/M2 | RESPIRATION RATE: 16 BRPM | HEIGHT: 67 IN | HEART RATE: 60 BPM | WEIGHT: 200 LBS | SYSTOLIC BLOOD PRESSURE: 113 MMHG

## 2018-10-24 DIAGNOSIS — Z12.11 ENCOUNTER FOR SCREENING FOR MALIGNANT NEOPLASM OF COLON: ICD-10-CM

## 2018-10-24 PROCEDURE — 99215 OFFICE O/P EST HI 40 MIN: CPT

## 2018-10-24 NOTE — ASSESSMENT
[FreeTextEntry1] : Physical exam shows a well-developed male in no acute distress blood pressure 113/72 height 5 foot 7 inches weight 200 pounds heart rate is 60 respiratory rate of 16 HEENT was unremarkable chest was clear cardiovascular exam was regular with no other heart sounds or murmurs abdomen soft and nontender there was no tenderness or rebound  extremities show no clubbing cyanosis or edema neurologic exam was nonfocal important to get the details of the colonoscopy from 3 years ago to decide how quickly the next colonoscopy needs to be performed although these 3 years he has undergone stenting the last being 3-6 months ago therefore his Plavix and aspirin cannot be stopped the scope can be done as a local only for based on the last colonoscopy if not judged to be urgent we couldn't wait an appropriate amount of time until the Plavix is discontinued he is to talk to the office of his previous gastroenterologist and to his cardiologist and final decisions will be made a MiraLax/Gatorade prep was described to the patient and all his questions were answered he is aware of the need for a right hold and also to stop for blood thinning medicines of days before risk of perforation and bleeding were reviewed and all questions were answered

## 2018-10-24 NOTE — HEALTH RISK ASSESSMENT
[No falls in past year] : Patient reported no falls in the past year [0] : 2) Feeling down, depressed, or hopeless: Not at all (0) [[id number]] : 0

## 2018-10-24 NOTE — REVIEW OF SYSTEMS
[Postnasal Drip] : postnasal drip [Nocturia] : nocturia [Back Pain] : back pain [Fever] : no fever [Chills] : no chills [Fatigue] : no fatigue [Hot Flashes] : no hot flashes [Night Sweats] : no night sweats [Recent Change In Weight] : ~T no recent weight change [Discharge] : no discharge [Pain] : no pain [Redness] : no redness [Dryness] : no dryness [Vision Problems] : no vision problems [Itching] : no itching [Earache] : no earache [Hearing Loss] : no hearing loss [Nosebleeds] : no nosebleeds [Nasal Discharge] : no nasal discharge [Sore Throat] : no sore throat [Hoarseness] : no hoarseness [Chest Pain] : no chest pain [Palpitations] : no palpitations [Claudication] : no  leg claudication [Lower Ext Edema] : no lower extremity edema [Orthopena] : no orthopnea [Paroysmal Nocturnal Dyspnea] : no paroysmal nocturnal dyspnea [Shortness Of Breath] : no shortness of breath [Wheezing] : no wheezing [Cough] : no cough [Dyspnea on Exertion] : not dyspnea on exertion [Abdominal Pain] : no abdominal pain [Nausea] : no nausea [Constipation] : no constipation [Diarrhea] : no diarrhea [Vomiting] : no vomiting [Heartburn] : no heartburn [Melena] : no melena [Dysuria] : no dysuria [Incontinence] : no incontinence [Hesitancy] : no hesitancy [Hematuria] : no hematuria [Frequency] : no frequency [Impotence] : no impotency [Poor Libido] : libido not poor [Joint Pain] : no joint pain [Joint Stiffness] : no joint stiffness [Muscle Pain] : no muscle pain [Muscle Weakness] : no muscle weakness [Joint Swelling] : no joint swelling [Mole Changes] : no mole changes [Nail Changes] : no nail changes [Hair Changes] : no hair changes [Skin Rash] : no skin rash [Headache] : no headache [Dizziness] : no dizziness [Fainting] : no fainting [Confusion] : no confusion [Unsteady Walk] : no ataxia [Memory Loss] : no memory loss [Suicidal] : not suicidal [Insomnia] : no insomnia [Anxiety] : no anxiety [Depression] : no depression [Easy Bleeding] : no easy bleeding [Easy Bruising] : no easy bruising [Swollen Glands] : no swollen glands

## 2018-10-24 NOTE — HISTORY OF PRESENT ILLNESS
[FreeTextEntry1] : Comes to the office for followup to review his medications and discuss his overall health issues with lower back pain on the left and interested in scheduling a colonoscopy [de-identified] : 63-year-old man with a history of bypass surgery and recent stents of his coronary arteries comes to the office for followup past history is also of an enlarged prostate GERD hyperlipidemia sleep apnea he is requesting a possible repeat colonoscopy 3 years ago he had one results of which are not available told by the endoscopist to consider a repeat colonoscopy in 3 years he denies temperature chills sweats or myalgias he said no headache sinus congestion cough wheezing chest pain shortness of breath exertional dyspnea dizziness lightheadedness palpitations vertigo or syncope denies abdominal pain left-sided lower back pain over the past 3 weeks said no nausea vomiting diarrhea constipation bright red blood per rectum or black stools the lower pain is intensified by movement of his back appetite has been good and his weight stable

## 2018-12-16 ENCOUNTER — FORM ENCOUNTER (OUTPATIENT)
Age: 63
End: 2018-12-16

## 2018-12-17 ENCOUNTER — OUTPATIENT (OUTPATIENT)
Dept: OUTPATIENT SERVICES | Facility: HOSPITAL | Age: 63
LOS: 1 days | End: 2018-12-17
Payer: COMMERCIAL

## 2018-12-17 ENCOUNTER — APPOINTMENT (OUTPATIENT)
Dept: INTERNAL MEDICINE | Facility: CLINIC | Age: 63
End: 2018-12-17
Payer: COMMERCIAL

## 2018-12-17 VITALS
DIASTOLIC BLOOD PRESSURE: 76 MMHG | HEART RATE: 64 BPM | WEIGHT: 202 LBS | RESPIRATION RATE: 15 BRPM | SYSTOLIC BLOOD PRESSURE: 114 MMHG | HEIGHT: 67 IN | BODY MASS INDEX: 31.71 KG/M2

## 2018-12-17 DIAGNOSIS — R60.0 LOCALIZED EDEMA: ICD-10-CM

## 2018-12-17 DIAGNOSIS — Z95.1 PRESENCE OF AORTOCORONARY BYPASS GRAFT: Chronic | ICD-10-CM

## 2018-12-17 PROCEDURE — 99215 OFFICE O/P EST HI 40 MIN: CPT

## 2018-12-17 PROCEDURE — 93971 EXTREMITY STUDY: CPT | Mod: 26,RT

## 2018-12-17 PROCEDURE — 93971 EXTREMITY STUDY: CPT

## 2018-12-17 NOTE — ASSESSMENT
[FreeTextEntry1] : Exam shows a well-developed man in no acute distress blood pressure 114/76 height 5 foot 7 weight 202 pounds heart rate is 64 respirations of 15 HEENT was unremarkable chest was clear cardiovascular was regular abdomen was soft and nontender extremities showed redness and tenderness on the top of the right foot associated with discomfort of the second toe compatible with gout pulses are strong right leg slightly edematous with negative Homans sent him for an immediate ultrasound of his lower extremity will contact his cardiologist today and discussed his normal rhythm that he had any shortness of breath probably need a workup either Holter monitor echo and possibly angiogram with a negative nuclear stress test just a year ago colchicine was ordered for his gout

## 2018-12-17 NOTE — HISTORY OF PRESENT ILLNESS
[FreeTextEntry1] : Comes to the office for followup to review his medications and discuss his overall health issues with right foot pain right leg swelling and an abnormal rhythm noted at the cardiac rehabilitation today [de-identified] : 63-year-old man with a history of a CABG coronary artery stents hyperlipidemia enlarged prostate comes to the office with a pain on the top of his right foot causing pain especially of the second toe patient has also noticed that the right leg slightly edematous he has been in contact with his cardiologist because over the past 6 months he notices some shortness of breath with exertion he claims to have had a nuclear stress test one year ago he denies temperature chills sweats or myalgias his head no headache sinus congestion sore throat cough wheezing chest pain shortness of breath exertional dyspnea he was noted to have an abnormal rhythm today at cardiac rehabilitation his rhythm was sent to his cardiologist to contact today

## 2018-12-17 NOTE — HEALTH RISK ASSESSMENT
[No falls in past year] : Patient reported no falls in the past year [0] : 2) Feeling down, depressed, or hopeless: Not at all (0) [[id number]] : 0

## 2018-12-17 NOTE — REVIEW OF SYSTEMS
[Palpitations] : palpitations [Nocturia] : nocturia [Joint Stiffness] : joint stiffness [Negative] : Heme/Lymph [Fever] : no fever [Chills] : no chills [Fatigue] : no fatigue [Hot Flashes] : no hot flashes [Night Sweats] : no night sweats [Recent Change In Weight] : ~T no recent weight change [Discharge] : no discharge [Pain] : no pain [Redness] : no redness [Dryness] : no dryness [Vision Problems] : no vision problems [Itching] : no itching [Earache] : no earache [Hearing Loss] : no hearing loss [Nosebleeds] : no nosebleeds [Postnasal Drip] : no postnasal drip [Nasal Discharge] : no nasal discharge [Sore Throat] : no sore throat [Hoarseness] : no hoarseness [Chest Pain] : no chest pain [Claudication] : no  leg claudication [Lower Ext Edema] : lower extremity edema [Orthopena] : no orthopnea [Paroysmal Nocturnal Dyspnea] : no paroysmal nocturnal dyspnea [Shortness Of Breath] : no shortness of breath [Wheezing] : no wheezing [Cough] : no cough [Dyspnea on Exertion] : not dyspnea on exertion [Abdominal Pain] : no abdominal pain [Nausea] : no nausea [Constipation] : no constipation [Diarrhea] : no diarrhea [Vomiting] : no vomiting [Heartburn] : no heartburn [Melena] : no melena [Dysuria] : no dysuria [Incontinence] : no incontinence [Hesitancy] : no hesitancy [Hematuria] : no hematuria [Frequency] : no frequency [Impotence] : no impotency [Poor Libido] : libido not poor [Joint Pain] : joint pain [Muscle Pain] : no muscle pain [Muscle Weakness] : no muscle weakness [Back Pain] : no back pain [Joint Swelling] : no joint swelling [Mole Changes] : no mole changes [Nail Changes] : no nail changes [Hair Changes] : no hair changes [Skin Rash] : no skin rash [Headache] : no headache [Dizziness] : no dizziness [Fainting] : no fainting [Confusion] : no confusion [Unsteady Walk] : no ataxia [Memory Loss] : no memory loss [Suicidal] : not suicidal [Insomnia] : no insomnia [Anxiety] : no anxiety [Depression] : no depression [Easy Bleeding] : no easy bleeding [Easy Bruising] : no easy bruising [Swollen Glands] : no swollen glands [FreeTextEntry5] : right leg [FreeTextEntry9] : right ft

## 2018-12-17 NOTE — PHYSICAL EXAM
[No Acute Distress] : no acute distress [Well Nourished] : well nourished [Well Developed] : well developed [Well-Appearing] : well-appearing [Normal Voice/Communication] : normal voice/communication [Normal Sclera/Conjunctiva] : normal sclera/conjunctiva [PERRL] : pupils equal round and reactive to light [EOMI] : extraocular movements intact [Normal Outer Ear/Nose] : the outer ears and nose were normal in appearance [Normal Oropharynx] : the oropharynx was normal [Normal TMs] : both tympanic membranes were normal [Normal Nasal Mucosa] : the nasal mucosa was normal [No JVD] : no jugular venous distention [Supple] : supple [No Lymphadenopathy] : no lymphadenopathy [Thyroid Normal, No Nodules] : the thyroid was normal and there were no nodules present [No Respiratory Distress] : no respiratory distress  [Clear to Auscultation] : lungs were clear to auscultation bilaterally [No Accessory Muscle Use] : no accessory muscle use [Normal Rate] : normal rate  [Regular Rhythm] : with a regular rhythm [Normal S1, S2] : normal S1 and S2 [No Murmur] : no murmur heard [No Carotid Bruits] : no carotid bruits [No Abdominal Bruit] : a ~M bruit was not heard ~T in the abdomen [No Varicosities] : no varicosities [Pedal Pulses Present] : the pedal pulses are present [No Edema] : there was no peripheral edema [No Extremity Clubbing/Cyanosis] : no extremity clubbing/cyanosis [No Palpable Aorta] : no palpable aorta [Soft] : abdomen soft [Non Tender] : non-tender [Non-distended] : non-distended [No Masses] : no abdominal mass palpated [No HSM] : no HSM [Normal Bowel Sounds] : normal bowel sounds [Normal Posterior Cervical Nodes] : no posterior cervical lymphadenopathy [Normal Anterior Cervical Nodes] : no anterior cervical lymphadenopathy [No CVA Tenderness] : no CVA  tenderness [No Spinal Tenderness] : no spinal tenderness [No Joint Swelling] : no joint swelling [Grossly Normal Strength/Tone] : grossly normal strength/tone [No Rash] : no rash [Normal Gait] : normal gait [Coordination Grossly Intact] : coordination grossly intact [No Focal Deficits] : no focal deficits [Deep Tendon Reflexes (DTR)] : deep tendon reflexes were 2+ and symmetric [Normal Affect] : the affect was normal [Normal Insight/Judgement] : insight and judgment were intact

## 2018-12-30 ENCOUNTER — TRANSCRIPTION ENCOUNTER (OUTPATIENT)
Age: 63
End: 2018-12-30

## 2019-01-18 ENCOUNTER — MEDICATION RENEWAL (OUTPATIENT)
Age: 64
End: 2019-01-18

## 2019-02-11 ENCOUNTER — APPOINTMENT (OUTPATIENT)
Dept: INTERNAL MEDICINE | Facility: CLINIC | Age: 64
End: 2019-02-11
Payer: COMMERCIAL

## 2019-02-11 VITALS
RESPIRATION RATE: 15 BRPM | BODY MASS INDEX: 32.02 KG/M2 | HEIGHT: 67 IN | WEIGHT: 204 LBS | DIASTOLIC BLOOD PRESSURE: 80 MMHG | SYSTOLIC BLOOD PRESSURE: 118 MMHG | HEART RATE: 68 BPM

## 2019-02-11 VITALS
OXYGEN SATURATION: 97 % | HEART RATE: 64 BPM | DIASTOLIC BLOOD PRESSURE: 70 MMHG | SYSTOLIC BLOOD PRESSURE: 110 MMHG | HEIGHT: 67 IN | WEIGHT: 203 LBS | TEMPERATURE: 98.1 F | BODY MASS INDEX: 31.86 KG/M2 | RESPIRATION RATE: 16 BRPM

## 2019-02-11 DIAGNOSIS — Z00.00 ENCOUNTER FOR GENERAL ADULT MEDICAL EXAMINATION W/OUT ABNORMAL FINDINGS: ICD-10-CM

## 2019-02-11 PROCEDURE — 99215 OFFICE O/P EST HI 40 MIN: CPT

## 2019-02-11 NOTE — HISTORY OF PRESENT ILLNESS
[FreeTextEntry1] : Comes to the office for followup to review his medications and discuss his overall health issues with anxiety and nocturia [de-identified] : 63-year-old man with a history of coronary artery disease status post bypass and stents GERD anxiety and large prostate hyperlipidemia sleep apnea comes to the office for followup patient continues to have intermittent anxiety denies temperature chills sweats or myalgias no headache sinus congestion sore throat or wheezing chest pain shortness of breath exertional dyspnea lightheadedness palpitations dizziness vertigo or syncope he's had no abdominal pain nausea vomiting diarrhea bright red blood per rectum or black stools his appetite has been good his weight is stable he does get up 2 or 3 times at night to urinate it is stable pattern for several years . He denies significant musculoskeletal discomfort

## 2019-02-11 NOTE — HEALTH RISK ASSESSMENT
[No falls in past year] : Patient reported no falls in the past year [0] : 2) Feeling down, depressed, or hopeless: Not at all (0) [[id number]] : 0

## 2019-02-11 NOTE — REVIEW OF SYSTEMS
[Nocturia] : nocturia [Negative] : Heme/Lymph [Fever] : no fever [Chills] : no chills [Fatigue] : no fatigue [Hot Flashes] : no hot flashes [Night Sweats] : no night sweats [Recent Change In Weight] : ~T no recent weight change [Discharge] : no discharge [Pain] : no pain [Redness] : no redness [Dryness] : no dryness [Vision Problems] : no vision problems [Itching] : no itching [Earache] : no earache [Hearing Loss] : no hearing loss [Nosebleeds] : no nosebleeds [Postnasal Drip] : no postnasal drip [Nasal Discharge] : no nasal discharge [Sore Throat] : no sore throat [Hoarseness] : no hoarseness [Chest Pain] : no chest pain [Palpitations] : no palpitations [Claudication] : no  leg claudication [Lower Ext Edema] : no lower extremity edema [Orthopena] : no orthopnea [Paroysmal Nocturnal Dyspnea] : no paroysmal nocturnal dyspnea [Shortness Of Breath] : no shortness of breath [Wheezing] : no wheezing [Cough] : no cough [Dyspnea on Exertion] : not dyspnea on exertion [Abdominal Pain] : no abdominal pain [Nausea] : no nausea [Constipation] : no constipation [Diarrhea] : no diarrhea [Vomiting] : no vomiting [Heartburn] : no heartburn [Melena] : no melena [Dysuria] : no dysuria [Incontinence] : no incontinence [Hesitancy] : no hesitancy [Hematuria] : no hematuria [Frequency] : no frequency [Impotence] : no impotency [Poor Libido] : libido not poor [Joint Pain] : no joint pain [Joint Stiffness] : joint stiffness [Muscle Pain] : no muscle pain [Muscle Weakness] : no muscle weakness [Back Pain] : no back pain [Joint Swelling] : no joint swelling [Itching] : no itching [Mole Changes] : no mole changes [Nail Changes] : no nail changes [Hair Changes] : no hair changes [Skin Rash] : no skin rash [Headache] : no headache [Dizziness] : no dizziness [Fainting] : no fainting [Confusion] : no confusion [Unsteady Walk] : no ataxia [Memory Loss] : no memory loss [Suicidal] : not suicidal [Insomnia] : insomnia [Anxiety] : anxiety [Depression] : depression [Easy Bleeding] : no easy bleeding [Easy Bruising] : no easy bruising [Swollen Glands] : no swollen glands

## 2019-02-11 NOTE — PHYSICAL EXAM
[No Acute Distress] : no acute distress [Well Nourished] : well nourished [Well Developed] : well developed [Well-Appearing] : well-appearing [Normal Sclera/Conjunctiva] : normal sclera/conjunctiva [PERRL] : pupils equal round and reactive to light [EOMI] : extraocular movements intact [Normal Outer Ear/Nose] : the outer ears and nose were normal in appearance [Normal Oropharynx] : the oropharynx was normal [No JVD] : no jugular venous distention [Supple] : supple [No Lymphadenopathy] : no lymphadenopathy [Thyroid Normal, No Nodules] : the thyroid was normal and there were no nodules present [No Respiratory Distress] : no respiratory distress  [Clear to Auscultation] : lungs were clear to auscultation bilaterally [No Accessory Muscle Use] : no accessory muscle use [Normal Rate] : normal rate  [Regular Rhythm] : with a regular rhythm [Normal S1, S2] : normal S1 and S2 [No Murmur] : no murmur heard [No Carotid Bruits] : no carotid bruits [No Abdominal Bruit] : a ~M bruit was not heard ~T in the abdomen [No Varicosities] : no varicosities [Pedal Pulses Present] : the pedal pulses are present [No Edema] : there was no peripheral edema [No Extremity Clubbing/Cyanosis] : no extremity clubbing/cyanosis [No Palpable Aorta] : no palpable aorta [Declined Breast Exam] : declined breast exam  [Soft] : abdomen soft [Non Tender] : non-tender [Non-distended] : non-distended [No Masses] : no abdominal mass palpated [No HSM] : no HSM [Normal Bowel Sounds] : normal bowel sounds [No Hernias] : no hernias [Declined Rectal Exam] : declined rectal exam [Normal Supraclavicular Nodes] : no supraclavicular lymphadenopathy [Normal Axillary Nodes] : no axillary lymphadenopathy [Normal Posterior Cervical Nodes] : no posterior cervical lymphadenopathy [Normal Femoral Nodes] : no femoral lymphadenopathy [Normal Anterior Cervical Nodes] : no anterior cervical lymphadenopathy [Normal Inguinal Nodes] : no inguinal lymphadenopathy [No CVA Tenderness] : no CVA  tenderness [No Spinal Tenderness] : no spinal tenderness [No Joint Swelling] : no joint swelling [Grossly Normal Strength/Tone] : grossly normal strength/tone [No Rash] : no rash [Normal Gait] : normal gait [Coordination Grossly Intact] : coordination grossly intact [No Focal Deficits] : no focal deficits [Deep Tendon Reflexes (DTR)] : deep tendon reflexes were 2+ and symmetric [Speech Grossly Normal] : speech grossly normal [Memory Grossly Normal] : memory grossly normal [Normal Affect] : the affect was normal [Alert and Oriented x3] : oriented to person, place, and time [Normal Mood] : the mood was normal [Normal Insight/Judgement] : insight and judgment were intact [Kyphosis] : no kyphosis [Scoliosis] : no scoliosis

## 2019-02-22 LAB
25(OH)D3 SERPL-MCNC: 36.7 NG/ML
ALBUMIN SERPL ELPH-MCNC: 4.3 G/DL
ALP BLD-CCNC: 66 U/L
ALT SERPL-CCNC: 18 U/L
ANION GAP SERPL CALC-SCNC: 13 MMOL/L
APPEARANCE: CLEAR
AST SERPL-CCNC: 16 U/L
BASOPHILS # BLD AUTO: 0.05 K/UL
BASOPHILS NFR BLD AUTO: 0.8 %
BILIRUB SERPL-MCNC: 0.4 MG/DL
BILIRUBIN URINE: NEGATIVE
BLOOD URINE: NEGATIVE
BUN SERPL-MCNC: 14 MG/DL
CALCIUM SERPL-MCNC: 9.1 MG/DL
CHLORIDE SERPL-SCNC: 101 MMOL/L
CHOLEST SERPL-MCNC: 146 MG/DL
CHOLEST/HDLC SERPL: 3 RATIO
CO2 SERPL-SCNC: 26 MMOL/L
COLOR: NORMAL
CREAT SERPL-MCNC: 1.01 MG/DL
CRP SERPL HS-MCNC: 1.58 MG/L
EOSINOPHIL # BLD AUTO: 0.23 K/UL
EOSINOPHIL NFR BLD AUTO: 3.7 %
GLUCOSE BS SERPL-MCNC: 112 MG/DL
GLUCOSE QUALITATIVE U: NEGATIVE
GLUCOSE SERPL-MCNC: 116 MG/DL
HBA1C MFR BLD HPLC: 5.7 %
HCT VFR BLD CALC: 45.9 %
HDLC SERPL-MCNC: 49 MG/DL
HGB BLD-MCNC: 14.8 G/DL
IMM GRANULOCYTES NFR BLD AUTO: 0.3 %
KETONES URINE: NEGATIVE
LDLC SERPL CALC-MCNC: 76 MG/DL
LEUKOCYTE ESTERASE URINE: NEGATIVE
LYMPHOCYTES # BLD AUTO: 1.89 K/UL
LYMPHOCYTES NFR BLD AUTO: 30.7 %
MAN DIFF?: NORMAL
MCHC RBC-ENTMCNC: 30.1 PG
MCHC RBC-ENTMCNC: 32.2 GM/DL
MCV RBC AUTO: 93.3 FL
MONOCYTES # BLD AUTO: 0.47 K/UL
MONOCYTES NFR BLD AUTO: 7.6 %
NEUTROPHILS # BLD AUTO: 3.5 K/UL
NEUTROPHILS NFR BLD AUTO: 56.9 %
NITRITE URINE: NEGATIVE
PH URINE: 7
PLATELET # BLD AUTO: 284 K/UL
POTASSIUM SERPL-SCNC: 4.5 MMOL/L
PROT SERPL-MCNC: 7.2 G/DL
PROTEIN URINE: NORMAL
PSA FREE FLD-MCNC: 34 %
PSA FREE SERPL-MCNC: 0.34 NG/ML
PSA SERPL-MCNC: 0.99 NG/ML
RBC # BLD: 4.92 M/UL
RBC # FLD: 13.5 %
SODIUM SERPL-SCNC: 140 MMOL/L
SPECIFIC GRAVITY URINE: 1.02
T4 FREE SERPL-MCNC: 1.1 NG/DL
TRIGL SERPL-MCNC: 103 MG/DL
TSH SERPL-ACNC: 2.45 UIU/ML
UROBILINOGEN URINE: NORMAL
VIT B12 SERPL-MCNC: 1462 PG/ML
WBC # FLD AUTO: 6.16 K/UL

## 2019-03-18 ENCOUNTER — MEDICATION RENEWAL (OUTPATIENT)
Age: 64
End: 2019-03-18

## 2019-03-18 RX ORDER — FOLIC ACID 1 MG/1
1 TABLET ORAL DAILY
Qty: 90 | Refills: 1 | Status: ACTIVE | COMMUNITY
Start: 2018-01-10 | End: 1900-01-01

## 2019-03-20 ENCOUNTER — RX RENEWAL (OUTPATIENT)
Age: 64
End: 2019-03-20

## 2019-05-16 ENCOUNTER — MEDICATION RENEWAL (OUTPATIENT)
Age: 64
End: 2019-05-16

## 2019-08-13 ENCOUNTER — RX RENEWAL (OUTPATIENT)
Age: 64
End: 2019-08-13

## 2019-08-23 ENCOUNTER — RX RENEWAL (OUTPATIENT)
Age: 64
End: 2019-08-23

## 2019-09-07 ENCOUNTER — RX RENEWAL (OUTPATIENT)
Age: 64
End: 2019-09-07

## 2019-09-17 ENCOUNTER — RX RENEWAL (OUTPATIENT)
Age: 64
End: 2019-09-17

## 2019-12-20 ENCOUNTER — APPOINTMENT (OUTPATIENT)
Dept: ORTHOPEDIC SURGERY | Facility: CLINIC | Age: 64
End: 2019-12-20

## 2019-12-24 ENCOUNTER — APPOINTMENT (OUTPATIENT)
Dept: ORTHOPEDIC SURGERY | Facility: CLINIC | Age: 64
End: 2019-12-24

## 2020-02-12 ENCOUNTER — RX RENEWAL (OUTPATIENT)
Age: 65
End: 2020-02-12

## 2020-03-13 ENCOUNTER — RX RENEWAL (OUTPATIENT)
Age: 65
End: 2020-03-13

## 2020-03-20 ENCOUNTER — EMERGENCY (EMERGENCY)
Facility: HOSPITAL | Age: 65
LOS: 1 days | Discharge: HOME | End: 2020-03-20
Attending: EMERGENCY MEDICINE | Admitting: EMERGENCY MEDICINE
Payer: MEDICARE

## 2020-03-20 VITALS
HEART RATE: 81 BPM | OXYGEN SATURATION: 97 % | SYSTOLIC BLOOD PRESSURE: 136 MMHG | RESPIRATION RATE: 17 BRPM | TEMPERATURE: 98 F | DIASTOLIC BLOOD PRESSURE: 83 MMHG | HEIGHT: 67 IN | WEIGHT: 199.96 LBS

## 2020-03-20 DIAGNOSIS — S21.152A OPEN BITE OF LEFT FRONT WALL OF THORAX WITHOUT PENETRATION INTO THORACIC CAVITY, INITIAL ENCOUNTER: ICD-10-CM

## 2020-03-20 DIAGNOSIS — Z95.1 PRESENCE OF AORTOCORONARY BYPASS GRAFT: Chronic | ICD-10-CM

## 2020-03-20 PROCEDURE — 99283 EMERGENCY DEPT VISIT LOW MDM: CPT

## 2020-03-20 PROCEDURE — 99283 EMERGENCY DEPT VISIT LOW MDM: CPT | Mod: 25

## 2020-03-20 PROCEDURE — 90715 TDAP VACCINE 7 YRS/> IM: CPT

## 2020-03-20 PROCEDURE — 90471 IMMUNIZATION ADMIN: CPT

## 2020-03-20 RX ORDER — ACETAMINOPHEN 500 MG
650 TABLET ORAL ONCE
Refills: 0 | Status: COMPLETED | OUTPATIENT
Start: 2020-03-20 | End: 2020-03-20

## 2020-03-20 RX ORDER — TETANUS TOXOID, REDUCED DIPHTHERIA TOXOID AND ACELLULAR PERTUSSIS VACCINE, ADSORBED 5; 2.5; 8; 8; 2.5 [IU]/.5ML; [IU]/.5ML; UG/.5ML; UG/.5ML; UG/.5ML
0.5 SUSPENSION INTRAMUSCULAR ONCE
Refills: 0 | Status: COMPLETED | OUTPATIENT
Start: 2020-03-20 | End: 2020-03-20

## 2020-03-20 RX ADMIN — Medication 650 MILLIGRAM(S): at 23:16

## 2020-03-20 RX ADMIN — Medication 1 TABLET(S): at 23:17

## 2020-03-20 RX ADMIN — TETANUS TOXOID, REDUCED DIPHTHERIA TOXOID AND ACELLULAR PERTUSSIS VACCINE, ADSORBED 0.5 MILLILITER(S): 5; 2.5; 8; 8; 2.5 SUSPENSION INTRAMUSCULAR at 23:21
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
## 2020-03-20 NOTE — ED ADULT NURSE NOTE - NSIMPLEMENTINTERV_GEN_ALL_ED
Implemented All Universal Safety Interventions:  [place] to call system. Call bell, personal items and telephone within reach. Instruct patient to call for assistance. Room bathroom lighting operational. Non-slip footwear when patient is off stretcher. Physically safe environment: no spills, clutter or unnecessary equipment. Stretcher in lowest position, wheels locked, appropriate side rails in place.

## 2020-03-20 NOTE — ED PROVIDER NOTE - NSFOLLOWUPINSTRUCTIONS_ED_ALL_ED_FT
Follow up with your primary care physician within 2-3 days     Please fill the prescription for the antibiotics and take as directed.  Please finish the entire course of medication as prescribed.  If you have any belly pain after the antibiotics, yogurt has been shown to help with this.  Do not use any alcohol or grapefruit juice with any antibiotics.    Stay hydrated    Return to the ER if your symptoms worsen or for any other medical emergencies  *************    Human Bite  Human bite wounds can get infected very quickly. It is important to get medical treatment.  What are the causes?  The condition is caused when one person bites another person. It may also happen when the closed fist of one person makes contact with the teeth of another person.  What are the signs or symptoms?  Common symptoms of a human bite include:  Bruising.Broken skin.Bleeding.Pain.How is this treated?  Treatment for this condition depends on where the bite is located and how bad the bite is. Treatment may include:  Caring for the wound. This includes:  Cleaning the wound.Washing the wound with a salt-water solution (saline).Applying a bandage (dressing).Leaving the wound open to heal because of the high risk of infection. This is done in some cases.Closing the wound with stitches (sutures), staples, skin glue, or skin tape (adhesive) strips. This is done in some cases.Taking antibiotic medicine.Having a tetanus shot.In some cases, bites that have become infected may need:  Antibiotics through an IV tube.Surgery.Follow these instructions at home:  Wound care     Follow instructions from your doctor about how to take care of your wound. Make sure you:  Wash your hands with soap and water before and after you change your bandage. If you cannot use soap and water, use hand .Change your bandage as told by your doctor.Leave stitches, skin glue, or skin tape strips in place. They may need to stay in place for 2 weeks or longer. If tape strips get loose and curl up, you may trim the loose edges. Do not remove tape strips completely unless your doctor says it is okay.Check your wound every day for signs of infection. Check for:  More redness, swelling, or pain.Fluid or blood.Warmth.Pus or a bad smell.Managing pain, stiffness, and swelling        If told, put ice on the injured area.  Put ice in a plastic bag.Place a towel between your skin and the bag.Leave the ice on for 20 minutes, 2–3 times per day.Raise (elevate) the injured area above the level of your heart while you are sitting or lying down.General instructions     Take or apply over-the-counter and prescription medicines only as told by your doctor.If you were prescribed an antibiotic medicine, take or apply it as told by your doctor. Do not stop using the antibiotic even if you start to feel better.Keep all follow-up visits as told by your doctor. This is important.Contact a doctor if:  You have chills.You have pain when you move your injured area.You have trouble moving your injured area.You are not getting better, or you are getting worse.Get help right away if:  You have a fever.You have increasing fluid, blood, or pus coming from your wound.You have increasing redness, swelling, or pain at the site of your wound.You have a red streak going away from your wound.Summary  Human bite wounds can get infected quickly.It is important to get medical treatment.Follow instructions from your doctor.If you were prescribed an antibiotic medicine, take or apply it as told by your doctor.Contact a doctor if you have signs of infection.This information is not intended to replace advice given to you by your health care provider. Make sure you discuss any questions you have with your health care provider.

## 2020-03-20 NOTE — ED PROVIDER NOTE - PATIENT PORTAL LINK FT
You can access the FollowMyHealth Patient Portal offered by [facility] by registering at the following website: [email or web address]. By joining [email or web address]’s FollowMyHealth portal, you will also be able to view your health information using other applications (apps) compatible with our system.
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
## 2020-08-06 ENCOUNTER — RX RENEWAL (OUTPATIENT)
Age: 65
End: 2020-08-06

## 2020-08-10 ENCOUNTER — RX RENEWAL (OUTPATIENT)
Age: 65
End: 2020-08-10

## 2020-08-12 ENCOUNTER — RX RENEWAL (OUTPATIENT)
Age: 65
End: 2020-08-12

## 2020-08-13 ENCOUNTER — APPOINTMENT (OUTPATIENT)
Dept: INTERNAL MEDICINE | Facility: CLINIC | Age: 65
End: 2020-08-13
Payer: MEDICARE

## 2020-08-13 VITALS
HEART RATE: 81 BPM | OXYGEN SATURATION: 99 % | SYSTOLIC BLOOD PRESSURE: 130 MMHG | HEIGHT: 67 IN | WEIGHT: 202 LBS | RESPIRATION RATE: 16 BRPM | BODY MASS INDEX: 31.71 KG/M2 | TEMPERATURE: 97.9 F | DIASTOLIC BLOOD PRESSURE: 70 MMHG

## 2020-08-13 PROCEDURE — G0444 DEPRESSION SCREEN ANNUAL: CPT | Mod: 59

## 2020-08-13 PROCEDURE — 99215 OFFICE O/P EST HI 40 MIN: CPT | Mod: 25

## 2020-08-13 RX ORDER — FINASTERIDE 5 MG/1
5 TABLET, FILM COATED ORAL
Refills: 0 | Status: ACTIVE | COMMUNITY
Start: 2020-08-13

## 2020-08-13 NOTE — ASSESSMENT
[FreeTextEntry1] : Physical exam shows a well-developed man in no acute distress blood pressure 130/70 height 5 feet 7 inches weight 202 pounds BMI 31.64 temperature of 97.9 °F heart rate of 81 respirations at 16 oxygen saturation room air was 99% HEENT was unremarkable chest was clear cardiovascular exam showed a normal S1 and S2 with no extra heart sounds or murmurs abdomen was soft and nontender extremities showed no clubbing cyanosis or edema neurologic exam was nonfocal patient has not followed back with his cardiologist in over a year he was strongly recommended to do so he does have an ophthalmologist that he sees he was given the names of some dermatologist he will call back to schedule a colonoscopy slip was given for complete blood test including CBC full chemistries lipid profile thyroid profile urinalysis CRP hemoglobin A1c vitamins D3 and B12 and IgG antibodies for COVID-19 measles mumps and rubella slip was given for a chest x-ray PA and lateral he was strongly recommended to receive the new shingles vaccine and to keep you only with the influenza vaccine he was also recommended to get the Prevnar 13 and the Pneumovax 23 she claims that his anxiety with his business well and within the next 6 months he does occasionally use his clonazepam but is not requesting a renewal which he has not asked for in almost a year patient recently saw urologist for decreased urinary stream and finasteride and tamsulosin were added as these medicines were just started a week ago he will keep an eye on the success and let me know

## 2020-08-13 NOTE — PHYSICAL EXAM
[No Acute Distress] : no acute distress [Well Nourished] : well nourished [Well Developed] : well developed [Well-Appearing] : well-appearing [Normal Voice/Communication] : normal voice/communication [Normal Sclera/Conjunctiva] : normal sclera/conjunctiva [EOMI] : extraocular movements intact [PERRL] : pupils equal round and reactive to light [Normal Outer Ear/Nose] : the outer ears and nose were normal in appearance [Normal Oropharynx] : the oropharynx was normal [Normal TMs] : both tympanic membranes were normal [Normal Nasal Mucosa] : the nasal mucosa was normal [No JVD] : no jugular venous distention [No Lymphadenopathy] : no lymphadenopathy [Supple] : supple [Thyroid Normal, No Nodules] : the thyroid was normal and there were no nodules present [No Respiratory Distress] : no respiratory distress  [No Accessory Muscle Use] : no accessory muscle use [Normal Percussion] : the chest was normal to percussion [Clear to Auscultation] : lungs were clear to auscultation bilaterally [Regular Rhythm] : with a regular rhythm [Normal Rate] : normal rate  [Normal S1, S2] : normal S1 and S2 [No Murmur] : no murmur heard [No Carotid Bruits] : no carotid bruits [No Abdominal Bruit] : a ~M bruit was not heard ~T in the abdomen [No Varicosities] : no varicosities [No Edema] : there was no peripheral edema [Pedal Pulses Present] : the pedal pulses are present [No Palpable Aorta] : no palpable aorta [No Extremity Clubbing/Cyanosis] : no extremity clubbing/cyanosis [Soft] : abdomen soft [Declined Breast Exam] : declined breast exam  [Non-distended] : non-distended [Non Tender] : non-tender [No Masses] : no abdominal mass palpated [No HSM] : no HSM [Normal Bowel Sounds] : normal bowel sounds [Declined Rectal Exam] : declined rectal exam [No Hernias] : no hernias [Normal Posterior Cervical Nodes] : no posterior cervical lymphadenopathy [Normal Supraclavicular Nodes] : no supraclavicular lymphadenopathy [Normal Axillary Nodes] : no axillary lymphadenopathy [Normal Inguinal Nodes] : no inguinal lymphadenopathy [Normal Anterior Cervical Nodes] : no anterior cervical lymphadenopathy [Normal Femoral Nodes] : no femoral lymphadenopathy [No CVA Tenderness] : no CVA  tenderness [No Spinal Tenderness] : no spinal tenderness [No Joint Swelling] : no joint swelling [Grossly Normal Strength/Tone] : grossly normal strength/tone [No Skin Lesions] : no skin lesions [No Rash] : no rash [Coordination Grossly Intact] : coordination grossly intact [Normal Gait] : normal gait [No Focal Deficits] : no focal deficits [Deep Tendon Reflexes (DTR)] : deep tendon reflexes were 2+ and symmetric [Speech Grossly Normal] : speech grossly normal [Normal Affect] : the affect was normal [Memory Grossly Normal] : memory grossly normal [Normal Mood] : the mood was normal [Alert and Oriented x3] : oriented to person, place, and time [Normal Insight/Judgement] : insight and judgment were intact [Kyphosis] : no kyphosis [Scoliosis] : no scoliosis [Acne] : no acne

## 2020-08-13 NOTE — HEALTH RISK ASSESSMENT
[Yes] : Yes [No falls in past year] : Patient reported no falls in the past year [0] : 2) Feeling down, depressed, or hopeless: Not at all (0) [[id number]] : 0 [] : No

## 2020-08-13 NOTE — HISTORY OF PRESENT ILLNESS
[FreeTextEntry1] : Comes to the office for follow-up to review his medications and discuss his overall health recent issues with excessive stress and anxiety [de-identified] : 65-year-old man comes to the office for follow-up with a history of an enlarged prostate atherosclerosis of the coronary arteries status post CABG obstructive sleep apnea stress and anxiety hyperlipidemia gout and GERD patient has been under a tremendous amount of professional stress with his job and his need to commute between [place] and the [facility] he denies temperature chills sweats or myalgias he has had no headache sinus congestion sore throat cough wheezing pleurisy chest pain shortness of breath exertional dyspnea lightheadedness palpitations dizziness vertigo or syncope he has occasional heartburn but denies abdominal pain nausea vomiting diarrhea constipation bright red blood per rectum or black stools his appetite has been good his weight is been stable he denies significant musculoskeletal complaints to urinate but denies dysuria or gross hematuria he gets occasional lower extremity edema has had no skin rashes and has been sleeping erratically

## 2020-08-13 NOTE — REVIEW OF SYSTEMS
[Nocturia] : nocturia [Joint Stiffness] : joint stiffness [Insomnia] : insomnia [Anxiety] : anxiety [Depression] : depression [Negative] : Heme/Lymph [Fever] : no fever [Chills] : no chills [Fatigue] : no fatigue [Hot Flashes] : no hot flashes [Night Sweats] : no night sweats [Discharge] : no discharge [Recent Change In Weight] : ~T no recent weight change [Pain] : no pain [Redness] : no redness [Itching] : no itching [Dryness] : no dryness [Vision Problems] : no vision problems [Earache] : no earache [Hearing Loss] : no hearing loss [Nosebleeds] : no nosebleeds [Nasal Discharge] : no nasal discharge [Postnasal Drip] : no postnasal drip [Hoarseness] : no hoarseness [Sore Throat] : no sore throat [Chest Pain] : no chest pain [Palpitations] : no palpitations [Claudication] : no  leg claudication [Lower Ext Edema] : no lower extremity edema [Orthopena] : no orthopnea [Shortness Of Breath] : no shortness of breath [Wheezing] : no wheezing [Cough] : no cough [Dyspnea on Exertion] : not dyspnea on exertion [Abdominal Pain] : no abdominal pain [Nausea] : no nausea [Constipation] : no constipation [Diarrhea] : no diarrhea [Heartburn] : no heartburn [Vomiting] : no vomiting [Melena] : no melena [Dysuria] : no dysuria [Hesitancy] : no hesitancy [Incontinence] : no incontinence [Hematuria] : no hematuria [Frequency] : no frequency [Impotence] : no impotency [Poor Libido] : libido not poor [Joint Pain] : no joint pain [Muscle Weakness] : no muscle weakness [Muscle Pain] : no muscle pain [Joint Swelling] : no joint swelling [Back Pain] : no back pain [Itching] : no itching [Mole Changes] : no mole changes [Hair Changes] : no hair changes [Nail Changes] : no nail changes [Skin Rash] : no skin rash [Headache] : no headache [Dizziness] : no dizziness [Fainting] : no fainting [Confusion] : no confusion [Unsteady Walk] : no ataxia [Memory Loss] : no memory loss [Suicidal] : not suicidal [Swollen Glands] : no swollen glands [Easy Bruising] : no easy bruising [Easy Bleeding] : no easy bleeding

## 2020-08-24 ENCOUNTER — APPOINTMENT (OUTPATIENT)
Dept: RADIOLOGY | Facility: HOSPITAL | Age: 65
End: 2020-08-24

## 2020-08-24 ENCOUNTER — OUTPATIENT (OUTPATIENT)
Dept: OUTPATIENT SERVICES | Facility: HOSPITAL | Age: 65
LOS: 1 days | End: 2020-08-24
Payer: MEDICARE

## 2020-08-24 DIAGNOSIS — Z00.8 ENCOUNTER FOR OTHER GENERAL EXAMINATION: ICD-10-CM

## 2020-08-24 DIAGNOSIS — Z95.1 PRESENCE OF AORTOCORONARY BYPASS GRAFT: Chronic | ICD-10-CM

## 2020-08-24 PROCEDURE — 71046 X-RAY EXAM CHEST 2 VIEWS: CPT

## 2020-08-24 PROCEDURE — 71046 X-RAY EXAM CHEST 2 VIEWS: CPT | Mod: 26

## 2020-10-01 ENCOUNTER — RX RENEWAL (OUTPATIENT)
Age: 65
End: 2020-10-01

## 2020-11-02 ENCOUNTER — RX RENEWAL (OUTPATIENT)
Age: 65
End: 2020-11-02

## 2020-11-02 DIAGNOSIS — Z23 ENCOUNTER FOR IMMUNIZATION: ICD-10-CM

## 2020-12-14 ENCOUNTER — APPOINTMENT (OUTPATIENT)
Dept: PULMONOLOGY | Facility: CLINIC | Age: 65
End: 2020-12-14
Payer: MEDICARE

## 2020-12-14 ENCOUNTER — RX RENEWAL (OUTPATIENT)
Age: 65
End: 2020-12-14

## 2020-12-14 ENCOUNTER — LABORATORY RESULT (OUTPATIENT)
Age: 65
End: 2020-12-14

## 2020-12-14 VITALS
OXYGEN SATURATION: 97 % | DIASTOLIC BLOOD PRESSURE: 72 MMHG | HEIGHT: 67 IN | SYSTOLIC BLOOD PRESSURE: 128 MMHG | TEMPERATURE: 97 F | WEIGHT: 205 LBS | HEART RATE: 61 BPM | BODY MASS INDEX: 32.18 KG/M2 | RESPIRATION RATE: 16 BRPM

## 2020-12-14 DIAGNOSIS — R05 COUGH: ICD-10-CM

## 2020-12-14 LAB
BASOPHILS # BLD AUTO: 0.06 K/UL
BASOPHILS NFR BLD AUTO: 0.9 %
EOSINOPHIL # BLD AUTO: 0.37 K/UL
EOSINOPHIL NFR BLD AUTO: 5.4 %
HCT VFR BLD CALC: 42.5 %
HGB BLD-MCNC: 13.7 G/DL
IMM GRANULOCYTES NFR BLD AUTO: 0 %
LYMPHOCYTES # BLD AUTO: 2.03 K/UL
LYMPHOCYTES NFR BLD AUTO: 29.9 %
MAN DIFF?: NORMAL
MCHC RBC-ENTMCNC: 29.3 PG
MCHC RBC-ENTMCNC: 32.2 GM/DL
MCV RBC AUTO: 91 FL
MONOCYTES # BLD AUTO: 0.79 K/UL
MONOCYTES NFR BLD AUTO: 11.6 %
NEUTROPHILS # BLD AUTO: 3.54 K/UL
NEUTROPHILS NFR BLD AUTO: 52.2 %
PLATELET # BLD AUTO: 252 K/UL
RBC # BLD: 4.67 M/UL
RBC # FLD: 12.9 %
WBC # FLD AUTO: 6.79 K/UL

## 2020-12-14 PROCEDURE — 99204 OFFICE O/P NEW MOD 45 MIN: CPT

## 2020-12-14 PROCEDURE — 99072 ADDL SUPL MATRL&STAF TM PHE: CPT

## 2020-12-14 NOTE — ASSESSMENT
[FreeTextEntry1] : Cough most likely related to allergies.  He does report a postnasal drip that is significant.  We discussed the possibility of starting Singulair which she would prefer to wait and see if the cough goes away.  We also recommended an allergy work-up which he agrees to.

## 2020-12-14 NOTE — REVIEW OF SYSTEMS
[Cough] : cough [Hemoptysis] : no hemoptysis [Sputum] : sputum [Wheezing] : wheezing [Negative] : Endocrine [TextBox_44] : Tree of coronary artery disease

## 2020-12-16 PROBLEM — Z12.11 ENCOUNTER FOR SCREENING COLONOSCOPY: Status: RESOLVED | Noted: 2018-10-24 | Resolved: 2020-12-16

## 2020-12-17 LAB — TOTAL IGE SMQN RAST: 478 KU/L

## 2020-12-18 LAB
A ALTERNATA IGE QN: <0.1 KUA/L
A FUMIGATUS IGE QN: <0.1 KUA/L
BERMUDA GRASS IGE QN: 1.47 KUA/L
BOXELDER IGE QN: 0.28 KUA/L
C HERBARUM IGE QN: <0.1 KUA/L
CALIF WALNUT IGE QN: 0.32 KUA/L
CAT DANDER IGE QN: <0.1 KUA/L
CMN PIGWEED IGE QN: 0.22 KUA/L
COMMON RAGWEED IGE QN: 2.01 KUA/L
COTTONWOOD IGE QN: 0.34 KUA/L
D FARINAE IGE QN: 6.83 KUA/L
D PTERONYSS IGE QN: 6.92 KUA/L
DEPRECATED A ALTERNATA IGE RAST QL: 0
DEPRECATED A FUMIGATUS IGE RAST QL: 0
DEPRECATED BERMUDA GRASS IGE RAST QL: 2
DEPRECATED BOXELDER IGE RAST QL: NORMAL
DEPRECATED C HERBARUM IGE RAST QL: 0
DEPRECATED CAT DANDER IGE RAST QL: 0
DEPRECATED COMMON PIGWEED IGE RAST QL: NORMAL
DEPRECATED COMMON RAGWEED IGE RAST QL: 2
DEPRECATED COTTONWOOD IGE RAST QL: NORMAL
DEPRECATED D FARINAE IGE RAST QL: 3
DEPRECATED D PTERONYSS IGE RAST QL: 3
DEPRECATED DOG DANDER IGE RAST QL: NORMAL
DEPRECATED GOOSEFOOT IGE RAST QL: 1
DEPRECATED LONDON PLANE IGE RAST QL: 1
DEPRECATED MUGWORT IGE RAST QL: NORMAL
DEPRECATED P NOTATUM IGE RAST QL: 0
DEPRECATED RED CEDAR IGE RAST QL: NORMAL
DEPRECATED ROACH IGE RAST QL: 2
DEPRECATED SHEEP SORREL IGE RAST QL: 1
DEPRECATED SILVER BIRCH IGE RAST QL: NORMAL
DEPRECATED TIMOTHY IGE RAST QL: 3
DEPRECATED WHITE ASH IGE RAST QL: 1
DEPRECATED WHITE OAK IGE RAST QL: 1
DOG DANDER IGE QN: 0.19 KUA/L
GOOSEFOOT IGE QN: 0.61 KUA/L
LONDON PLANE IGE QN: 0.35 KUA/L
MUGWORT IGE QN: 0.34 KUA/L
MULBERRY (T70) CLASS: NORMAL
MULBERRY (T70) CONC: 0.18 KUA/L
P NOTATUM IGE QN: <0.1 KUA/L
RED CEDAR IGE QN: 0.34 KUA/L
ROACH IGE QN: 0.83 KUA/L
SHEEP SORREL IGE QN: 0.41 KUA/L
SILVER BIRCH IGE QN: 0.12 KUA/L
TIMOTHY IGE QN: 14.1 KUA/L
TREE ALLERG MIX1 IGE QL: NORMAL
WHITE ASH IGE QN: 0.36 KUA/L
WHITE ELM IGE QN: 0.38 KUA/L
WHITE ELM IGE QN: 1
WHITE OAK IGE QN: 0.35 KUA/L

## 2020-12-27 ENCOUNTER — TRANSCRIPTION ENCOUNTER (OUTPATIENT)
Age: 65
End: 2020-12-27

## 2021-02-03 ENCOUNTER — APPOINTMENT (OUTPATIENT)
Dept: INTERNAL MEDICINE | Facility: CLINIC | Age: 66
End: 2021-02-03
Payer: MEDICARE

## 2021-02-03 VITALS
OXYGEN SATURATION: 97 % | TEMPERATURE: 97.5 F | WEIGHT: 215 LBS | SYSTOLIC BLOOD PRESSURE: 118 MMHG | BODY MASS INDEX: 33.74 KG/M2 | DIASTOLIC BLOOD PRESSURE: 62 MMHG | RESPIRATION RATE: 16 BRPM | HEIGHT: 67 IN | HEART RATE: 70 BPM

## 2021-02-03 DIAGNOSIS — M10.9 GOUT, UNSPECIFIED: ICD-10-CM

## 2021-02-03 PROCEDURE — 99215 OFFICE O/P EST HI 40 MIN: CPT

## 2021-02-03 RX ORDER — ZOSTER VACCINE RECOMBINANT, ADJUVANTED 50 MCG/0.5
50 KIT INTRAMUSCULAR
Qty: 1 | Refills: 0 | Status: DISCONTINUED | COMMUNITY
Start: 2020-11-02 | End: 2021-02-03

## 2021-02-04 NOTE — HISTORY OF PRESENT ILLNESS
[de-identified] : Comes to the office for follow-up with a history of coronary artery disease status post coronary artery bypass and stents anxiety gastroesophageal reflux disease hyperlipidemia benign prostatic hyperplasia obstructive sleep apnea and gout patient denies temperature chills sweats or myalgias he has had no headache sinus congestion sore throat cough wheezing pleurisy chest pain shortness of breath exertional dyspnea lightheadedness palpitations dizziness vertigo or syncope denies abdominal pain nausea vomiting diarrhea constipation bright red blood per rectum or black stools his appetite has been good his weight has been stable he denies significant musculoskeletal pains he does get up at night to urinate but denies dysuria or gross hematuria denies leg edema skin rashes he has been sleeping poorly with high levels of his anxiety and depression [FreeTextEntry1] : Comes to the office for a follow-up to review his medications and discuss his overall health recent episodes of anxiety and depression

## 2021-02-04 NOTE — ASSESSMENT
[FreeTextEntry1] : Physical examination shows a well-developed man in no acute distress blood pressure is 118/62 height of 5 foot 7 inches weight 215 pounds BMI 33.67 temperature of 97.5 °F heart rate of 70 respiratory rate of 16 HEENT was unremarkable chest was clear cardiovascular exam was regular abdomen was soft and nontender extremities showed no clubbing cyanosis or edema neurologic exam was nonfocal patient was given a slip for complete blood test at the end of 2020 he was reminded that he should get these blood tests specially in view of him being on a statin he is up-to-date with his ophthalmologist he will attempt to get to a dermatologist for cancer screening this year will discuss with his gastroenterologist whether he is due for next colonoscopy patient was advised to consider the influenza vaccine the Prevnar 13 and the Pneumovax 23 and both doses of the shingles vaccine Long discussion with him concerning the COVID-19 vaccine patient's medications were reviewed and renewed where appropriate patient continues to use his CPAP machine for his obstructive sleep apnea patient urinates once or twice at night and wishes to withhold urologic consultation unless the situation worsen

## 2021-02-04 NOTE — REVIEW OF SYSTEMS
[Nocturia] : nocturia [Joint Stiffness] : joint stiffness [Insomnia] : insomnia [Anxiety] : anxiety [Depression] : depression [Negative] : Heme/Lymph [Fever] : no fever [Chills] : no chills [Fatigue] : no fatigue [Hot Flashes] : no hot flashes [Night Sweats] : no night sweats [Recent Change In Weight] : ~T no recent weight change [Discharge] : no discharge [Pain] : no pain [Redness] : no redness [Dryness] : no dryness [Vision Problems] : no vision problems [Itching] : no itching [Earache] : no earache [Hearing Loss] : no hearing loss [Nosebleeds] : no nosebleeds [Postnasal Drip] : no postnasal drip [Nasal Discharge] : no nasal discharge [Sore Throat] : no sore throat [Hoarseness] : no hoarseness [Chest Pain] : no chest pain [Palpitations] : no palpitations [Claudication] : no  leg claudication [Lower Ext Edema] : no lower extremity edema [Orthopena] : no orthopnea [Paroxysmal Nocturnal Dyspnea] : no paroxysmal nocturnal dyspnea [Shortness Of Breath] : no shortness of breath [Wheezing] : no wheezing [Cough] : no cough [Dyspnea on Exertion] : not dyspnea on exertion [Abdominal Pain] : no abdominal pain [Nausea] : no nausea [Constipation] : no constipation [Diarrhea] : no diarrhea [Vomiting] : no vomiting [Heartburn] : no heartburn [Melena] : no melena [Dysuria] : no dysuria [Incontinence] : no incontinence [Hesitancy] : no hesitancy [Hematuria] : no hematuria [Frequency] : no frequency [Impotence] : no impotency [Poor Libido] : libido not poor [Joint Pain] : no joint pain [Muscle Pain] : no muscle pain [Muscle Weakness] : no muscle weakness [Back Pain] : no back pain [Joint Swelling] : no joint swelling [Mole Changes] : no mole changes [Nail Changes] : no nail changes [Hair Changes] : no hair changes [Skin Rash] : no skin rash [Headache] : no headache [Dizziness] : no dizziness [Fainting] : no fainting [Confusion] : no confusion [Unsteady Walk] : no ataxia [Memory Loss] : no memory loss [Suicidal] : not suicidal [Easy Bruising] : no easy bruising [Easy Bleeding] : no easy bleeding [Swollen Glands] : no swollen glands

## 2021-02-18 ENCOUNTER — RX RENEWAL (OUTPATIENT)
Age: 66
End: 2021-02-18

## 2021-03-11 ENCOUNTER — RX RENEWAL (OUTPATIENT)
Age: 66
End: 2021-03-11

## 2021-03-24 ENCOUNTER — RX RENEWAL (OUTPATIENT)
Age: 66
End: 2021-03-24

## 2021-04-07 ENCOUNTER — APPOINTMENT (OUTPATIENT)
Dept: INTERNAL MEDICINE | Facility: CLINIC | Age: 66
End: 2021-04-07
Payer: MEDICARE

## 2021-04-07 ENCOUNTER — RX RENEWAL (OUTPATIENT)
Age: 66
End: 2021-04-07

## 2021-04-07 VITALS
DIASTOLIC BLOOD PRESSURE: 60 MMHG | OXYGEN SATURATION: 97 % | TEMPERATURE: 98.1 F | RESPIRATION RATE: 16 BRPM | HEART RATE: 81 BPM | HEIGHT: 67 IN | SYSTOLIC BLOOD PRESSURE: 134 MMHG | WEIGHT: 225 LBS | BODY MASS INDEX: 35.31 KG/M2

## 2021-04-07 DIAGNOSIS — E78.5 HYPERLIPIDEMIA, UNSPECIFIED: ICD-10-CM

## 2021-04-07 DIAGNOSIS — Z95.5 PRESENCE OF CORONARY ANGIOPLASTY IMPLANT AND GRAFT: ICD-10-CM

## 2021-04-07 DIAGNOSIS — E66.9 OBESITY, UNSPECIFIED: ICD-10-CM

## 2021-04-07 DIAGNOSIS — N40.0 BENIGN PROSTATIC HYPERPLASIA WITHOUT LOWER URINARY TRACT SYMPMS: ICD-10-CM

## 2021-04-07 DIAGNOSIS — K21.9 GASTRO-ESOPHAGEAL REFLUX DISEASE W/OUT ESOPHAGITIS: ICD-10-CM

## 2021-04-07 DIAGNOSIS — G47.33 OBSTRUCTIVE SLEEP APNEA (ADULT) (PEDIATRIC): ICD-10-CM

## 2021-04-07 DIAGNOSIS — I25.10 ATHEROSCLEROTIC HEART DISEASE OF NATIVE CORONARY ARTERY W/OUT ANGINA PECTORIS: ICD-10-CM

## 2021-04-07 DIAGNOSIS — Z95.1 PRESENCE OF AORTOCORONARY BYPASS GRAFT: ICD-10-CM

## 2021-04-07 DIAGNOSIS — F41.9 ANXIETY DISORDER, UNSPECIFIED: ICD-10-CM

## 2021-04-07 PROCEDURE — 99072 ADDL SUPL MATRL&STAF TM PHE: CPT

## 2021-04-07 PROCEDURE — 99215 OFFICE O/P EST HI 40 MIN: CPT

## 2021-04-08 PROBLEM — Z95.5 H/O HEART ARTERY STENT: Status: ACTIVE | Noted: 2018-02-07

## 2021-04-08 PROBLEM — Z95.1 S/P CABG (CORONARY ARTERY BYPASS GRAFT): Status: ACTIVE | Noted: 2018-08-22

## 2021-04-08 PROBLEM — I25.10 ARTERIOSCLEROSIS OF CORONARY ARTERY: Status: ACTIVE | Noted: 2018-02-07

## 2021-04-08 PROBLEM — F41.9 ANXIETY: Status: ACTIVE | Noted: 2018-02-07

## 2021-04-08 PROBLEM — E66.9 OBESITY (BMI 35.0-39.9 WITHOUT COMORBIDITY): Status: ACTIVE | Noted: 2021-04-08

## 2021-04-08 NOTE — HISTORY OF PRESENT ILLNESS
[FreeTextEntry1] : Comes to the office for follow-up to review his medications and discuss his overall health recent issues with bloating and acid reflux [de-identified] : 66-year-old man comes to the office for follow-up with a history of gastroesophageal reflux disease benign prostatic hyperplasia hyperlipidemia obstructive sleep apnea anxiety coronary artery disease with bypass and stent patient has noted recently that he has had some upper abdominal bloating patient denies temperature chills sweats or myalgias he has had no headache sinus congestion sore throat cough wheezing pleurisy chest pain shortness of breath exertional dyspnea lightheadedness palpitations dizziness vertigo or syncope he denies abdominal pain nausea vomiting diarrhea constipation bright red blood per rectum or black stools he has had some upper abdominal distention his appetite has been good his weight has been stable does get up at night to urinate but denies dysuria or gross hematuria he has had no leg edema skin rashes and recently has been under a lot of stress and has been sleeping poorly of note patient's eating habits are erratic and sometimes late at night

## 2021-04-08 NOTE — PHYSICAL EXAM
[No Acute Distress] : no acute distress [Well Nourished] : well nourished [Well Developed] : well developed [Well-Appearing] : well-appearing [Normal Voice/Communication] : normal voice/communication [Normal Sclera/Conjunctiva] : normal sclera/conjunctiva [PERRL] : pupils equal round and reactive to light [EOMI] : extraocular movements intact [Normal Outer Ear/Nose] : the outer ears and nose were normal in appearance [Normal Oropharynx] : the oropharynx was normal [Normal TMs] : both tympanic membranes were normal [Normal Nasal Mucosa] : the nasal mucosa was normal [No JVD] : no jugular venous distention [No Lymphadenopathy] : no lymphadenopathy [Supple] : supple [Thyroid Normal, No Nodules] : the thyroid was normal and there were no nodules present [No Respiratory Distress] : no respiratory distress  [No Accessory Muscle Use] : no accessory muscle use [Clear to Auscultation] : lungs were clear to auscultation bilaterally [Normal Percussion] : the chest was normal to percussion [Normal Rate] : normal rate  [Regular Rhythm] : with a regular rhythm [Normal S1, S2] : normal S1 and S2 [No Murmur] : no murmur heard [No Carotid Bruits] : no carotid bruits [No Abdominal Bruit] : a ~M bruit was not heard ~T in the abdomen [No Varicosities] : no varicosities [Pedal Pulses Present] : the pedal pulses are present [No Edema] : there was no peripheral edema [No Palpable Aorta] : no palpable aorta [No Extremity Clubbing/Cyanosis] : no extremity clubbing/cyanosis [Declined Breast Exam] : declined breast exam  [Non Tender] : non-tender [Soft] : abdomen soft [Non-distended] : non-distended [No Masses] : no abdominal mass palpated [No HSM] : no HSM [Normal Bowel Sounds] : normal bowel sounds [No Hernias] : no hernias [Declined Rectal Exam] : declined rectal exam [Normal Supraclavicular Nodes] : no supraclavicular lymphadenopathy [Normal Axillary Nodes] : no axillary lymphadenopathy [Normal Posterior Cervical Nodes] : no posterior cervical lymphadenopathy [Normal Anterior Cervical Nodes] : no anterior cervical lymphadenopathy [Normal Inguinal Nodes] : no inguinal lymphadenopathy [No CVA Tenderness] : no CVA  tenderness [Normal Femoral Nodes] : no femoral lymphadenopathy [No Spinal Tenderness] : no spinal tenderness [No Joint Swelling] : no joint swelling [Grossly Normal Strength/Tone] : grossly normal strength/tone [No Rash] : no rash [No Skin Lesions] : no skin lesions [Coordination Grossly Intact] : coordination grossly intact [No Focal Deficits] : no focal deficits [Normal Gait] : normal gait [Deep Tendon Reflexes (DTR)] : deep tendon reflexes were 2+ and symmetric [Speech Grossly Normal] : speech grossly normal [Memory Grossly Normal] : memory grossly normal [Normal Affect] : the affect was normal [Alert and Oriented x3] : oriented to person, place, and time [Normal Mood] : the mood was normal [Normal Insight/Judgement] : insight and judgment were intact [Kyphosis] : no kyphosis [Scoliosis] : no scoliosis [Acne] : no acne

## 2021-04-08 NOTE — ASSESSMENT
[FreeTextEntry1] : Physical examination reveals a well-developed man in no acute distress blood pressure 134/60 height 5 feet 7 inches weight 225 pounds BMI 35.24 temperature 98.1 °F heart rate of 80 respirations 16 HEENT was unremarkable chest was clear abdomen was soft there were redundant rolls of adipose tissue accounting for his distention of note patient has gained weight recently there was no tenderness hepatosplenomegaly or peritoneal signs extremities showed no clubbing cyanosis or edema neurologic exam was nonfocal patient was advised to diet as his BMI has risen over 35 carbohydrate restriction portion management proper selection and exercise were suggested a slip was given for complete blood tests eluding CBC full chemistries lipid profile thyroid profile urine analysis CRP vitamins D3 and B12 hemoglobin A1c and IgG nuclear capsid antibody for COVID-19 Long discussion with the patient concerning his eating habits and the types of foods he is eating as contributing to his weight gain patient is motivated to correct his eating habits and lifestyle patient will consider the COVID-19 vaccine Long discussion with him concerning the pros and cons he is up-to-date with his ophthalmologist and dermatologist

## 2021-04-08 NOTE — REVIEW OF SYSTEMS
[Nocturia] : nocturia [Joint Stiffness] : joint stiffness [Insomnia] : insomnia [Anxiety] : anxiety [Depression] : depression [Negative] : Heme/Lymph [Fever] : no fever [Chills] : no chills [Fatigue] : no fatigue [Hot Flashes] : no hot flashes [Night Sweats] : no night sweats [Recent Change In Weight] : ~T no recent weight change [Discharge] : no discharge [Pain] : no pain [Redness] : no redness [Dryness] : no dryness [Vision Problems] : no vision problems [Itching] : no itching [Earache] : no earache [Hearing Loss] : no hearing loss [Nosebleeds] : no nosebleeds [Postnasal Drip] : no postnasal drip [Nasal Discharge] : no nasal discharge [Sore Throat] : no sore throat [Hoarseness] : no hoarseness [Chest Pain] : no chest pain [Palpitations] : no palpitations [Claudication] : no  leg claudication [Lower Ext Edema] : no lower extremity edema [Orthopena] : no orthopnea [Paroxysmal Nocturnal Dyspnea] : no paroxysmal nocturnal dyspnea [Shortness Of Breath] : no shortness of breath [Wheezing] : no wheezing [Cough] : no cough [Dyspnea on Exertion] : not dyspnea on exertion [Abdominal Pain] : no abdominal pain [Nausea] : no nausea [Constipation] : no constipation [Diarrhea] : no diarrhea [Vomiting] : no vomiting [Heartburn] : no heartburn [Melena] : no melena [Dysuria] : no dysuria [Incontinence] : no incontinence [Hesitancy] : no hesitancy [Hematuria] : no hematuria [Frequency] : no frequency [Impotence] : no impotency [Poor Libido] : libido not poor [Joint Pain] : no joint pain [Muscle Pain] : no muscle pain [Muscle Weakness] : no muscle weakness [Back Pain] : no back pain [Joint Swelling] : no joint swelling [Itching] : no itching [Mole Changes] : no mole changes [Nail Changes] : no nail changes [Hair Changes] : no hair changes [Skin Rash] : no skin rash [Headache] : no headache [Dizziness] : no dizziness [Fainting] : no fainting [Confusion] : no confusion [Unsteady Walk] : no ataxia [Memory Loss] : no memory loss [Suicidal] : not suicidal [Easy Bleeding] : no easy bleeding [Easy Bruising] : no easy bruising [Swollen Glands] : no swollen glands [FreeTextEntry7] : upper abd distension

## 2021-04-08 NOTE — HEALTH RISK ASSESSMENT
[Yes] : Yes [No falls in past year] : Patient reported no falls in the past year [0] : 2) Feeling down, depressed, or hopeless: Not at all (0) [] : No [[id number]] : 0

## 2021-04-20 LAB
25(OH)D3 SERPL-MCNC: 55.4 NG/ML
ALBUMIN SERPL ELPH-MCNC: 4.5 G/DL
ALP BLD-CCNC: 77 U/L
ALT SERPL-CCNC: 17 U/L
ANION GAP SERPL CALC-SCNC: 16 MMOL/L
APPEARANCE: CLEAR
AST SERPL-CCNC: 17 U/L
BASOPHILS # BLD AUTO: 0.05 K/UL
BASOPHILS NFR BLD AUTO: 0.7 %
BILIRUB SERPL-MCNC: 0.4 MG/DL
BILIRUBIN URINE: NEGATIVE
BLOOD URINE: NEGATIVE
BUN SERPL-MCNC: 19 MG/DL
CALCIUM SERPL-MCNC: 9.5 MG/DL
CHLORIDE SERPL-SCNC: 102 MMOL/L
CHOLEST SERPL-MCNC: 184 MG/DL
CO2 SERPL-SCNC: 22 MMOL/L
COLOR: NORMAL
COVID-19 NUCLEOCAPSID  GAM ANTIBODY INTERPRETATION: NEGATIVE
CREAT SERPL-MCNC: 1.06 MG/DL
CRP SERPL HS-MCNC: 1.73 MG/L
EOSINOPHIL # BLD AUTO: 0.6 K/UL
EOSINOPHIL NFR BLD AUTO: 8.8 %
ESTIMATED AVERAGE GLUCOSE: 120 MG/DL
GLUCOSE BS SERPL-MCNC: 104 MG/DL
GLUCOSE QUALITATIVE U: NEGATIVE
GLUCOSE SERPL-MCNC: 111 MG/DL
HBA1C MFR BLD HPLC: 5.8 %
HCT VFR BLD CALC: 45.1 %
HDLC SERPL-MCNC: 56 MG/DL
HGB BLD-MCNC: 14.8 G/DL
IMM GRANULOCYTES NFR BLD AUTO: 0.3 %
KETONES URINE: NEGATIVE
LDLC SERPL CALC-MCNC: 105 MG/DL
LEUKOCYTE ESTERASE URINE: NEGATIVE
LYMPHOCYTES # BLD AUTO: 1.9 K/UL
LYMPHOCYTES NFR BLD AUTO: 27.8 %
MAN DIFF?: NORMAL
MCHC RBC-ENTMCNC: 29.7 PG
MCHC RBC-ENTMCNC: 32.8 GM/DL
MCV RBC AUTO: 90.4 FL
MONOCYTES # BLD AUTO: 0.59 K/UL
MONOCYTES NFR BLD AUTO: 8.6 %
NEUTROPHILS # BLD AUTO: 3.68 K/UL
NEUTROPHILS NFR BLD AUTO: 53.8 %
NITRITE URINE: NEGATIVE
NONHDLC SERPL-MCNC: 128 MG/DL
PH URINE: 6
PLATELET # BLD AUTO: 276 K/UL
POTASSIUM SERPL-SCNC: 4.3 MMOL/L
PROT SERPL-MCNC: 7.7 G/DL
PROTEIN URINE: NEGATIVE
PSA FREE FLD-MCNC: 25 %
PSA FREE SERPL-MCNC: 0.32 NG/ML
PSA SERPL-MCNC: 1.28 NG/ML
RBC # BLD: 4.99 M/UL
RBC # FLD: 13.6 %
SARS-COV-2 AB SERPL QL IA: 0.09 INDEX
SODIUM SERPL-SCNC: 140 MMOL/L
SPECIFIC GRAVITY URINE: 1.02
T4 FREE SERPL-MCNC: 1.1 NG/DL
TESTOST BND SERPL-MCNC: 12.6 PG/ML
TESTOST SERPL-MCNC: 680.4 NG/DL
TRIGL SERPL-MCNC: 117 MG/DL
TSH SERPL-ACNC: 2.41 UIU/ML
UROBILINOGEN URINE: NORMAL
VIT B12 SERPL-MCNC: >2000 PG/ML
WBC # FLD AUTO: 6.84 K/UL

## 2021-04-29 ENCOUNTER — RX RENEWAL (OUTPATIENT)
Age: 66
End: 2021-04-29

## 2021-04-29 RX ORDER — CLONAZEPAM 1 MG/1
1 TABLET ORAL
Qty: 60 | Refills: 0 | Status: ACTIVE | COMMUNITY
Start: 2018-02-07 | End: 1900-01-01

## 2021-07-06 ENCOUNTER — RX RENEWAL (OUTPATIENT)
Age: 66
End: 2021-07-06

## 2021-07-22 ENCOUNTER — APPOINTMENT (OUTPATIENT)
Dept: ORTHOPEDIC SURGERY | Facility: CLINIC | Age: 66
End: 2021-07-22
Payer: MEDICARE

## 2021-07-22 VITALS — WEIGHT: 214 LBS | HEIGHT: 68 IN | BODY MASS INDEX: 32.43 KG/M2

## 2021-07-22 PROCEDURE — 99203 OFFICE O/P NEW LOW 30 MIN: CPT

## 2021-07-22 NOTE — END OF VISIT
[FreeTextEntry3] : All medical record entries made by the Scribe were at my,  Dr. Andrei Tiwari MD., direction and personally dictated by me on 07/22/2021. I have personally reviewed the chart and agree that the record accurately reflects my personal performance of the history, physical exam, assessment and plan.\par \par

## 2021-07-22 NOTE — PHYSICAL EXAM
[de-identified] : Patient is WDWN, alert, and in no acute distress. Breathing is unlabored. He is grossly oriented to person, place, \par and time.\par \par Left Hand: (Index finger):\par Mild discomfort with flexion of the index finger.\par Granuloma present.\par Sensation is normal. [de-identified] : AP and oblique views of the left hand (index finger) were obtained on 7/12/21 and revealed presence of a foreign body.

## 2021-07-22 NOTE — HISTORY OF PRESENT ILLNESS
[de-identified] : Pt is a 65 y/o male with left hand foreign body.  A light bulb broke in his hand 6-7 weeks ago.  His hand was cut but it was not very deep.  He cleaned it out himself.  He did not go to the ED or Urgent Care.  He states that he noticed a bump forming under the skin.  He had xrays taken which showed a piece of glass.  He states that he is not having pain generally.  Occasionally, he feels a little pinch when he leans on the hand.  He would like to have the glass removed surgically.\par \par Of note, patient is status post bypass surgery in 2007.

## 2021-07-22 NOTE — DISCUSSION/SUMMARY
[de-identified] : XR films were reviewed with the patient. Discussed at length the nature of the patient’s condition. The left hand symptoms appear secondary to presence of foreign body.\par \par The underlying pathophysiology was reviewed with the patient. Treatment options were discussed including; surgical intervention. The patient wishes to proceed with excision of foreign body in the left index finger at this time. The risks and benefits were reviewed with the patient. All of his questions were answered. He will meet with our surgical scheduler.

## 2021-07-22 NOTE — ADDENDUM
[FreeTextEntry1] : I, Cynthia Tam wrote this note acting as a scribe for Dr. Andrei Tiwari on Jul 22, 2021.\par \par

## 2021-08-09 ENCOUNTER — OUTPATIENT (OUTPATIENT)
Dept: OUTPATIENT SERVICES | Facility: HOSPITAL | Age: 66
LOS: 1 days | End: 2021-08-09
Payer: MEDICARE

## 2021-08-09 VITALS
DIASTOLIC BLOOD PRESSURE: 66 MMHG | OXYGEN SATURATION: 98 % | WEIGHT: 213.85 LBS | SYSTOLIC BLOOD PRESSURE: 110 MMHG | RESPIRATION RATE: 16 BRPM | HEART RATE: 53 BPM | TEMPERATURE: 97 F | HEIGHT: 67.5 IN

## 2021-08-09 DIAGNOSIS — Z95.1 PRESENCE OF AORTOCORONARY BYPASS GRAFT: Chronic | ICD-10-CM

## 2021-08-09 DIAGNOSIS — T14.8XXA OTHER INJURY OF UNSPECIFIED BODY REGION, INITIAL ENCOUNTER: ICD-10-CM

## 2021-08-09 DIAGNOSIS — G47.33 OBSTRUCTIVE SLEEP APNEA (ADULT) (PEDIATRIC): ICD-10-CM

## 2021-08-09 DIAGNOSIS — Z01.818 ENCOUNTER FOR OTHER PREPROCEDURAL EXAMINATION: ICD-10-CM

## 2021-08-09 DIAGNOSIS — I25.10 ATHEROSCLEROTIC HEART DISEASE OF NATIVE CORONARY ARTERY WITHOUT ANGINA PECTORIS: ICD-10-CM

## 2021-08-09 PROCEDURE — G0463: CPT

## 2021-08-09 NOTE — H&P PST ADULT - NSICDXPROBLEM_GEN_ALL_CORE_FT
PROBLEM DIAGNOSES  Problem: Other injury of unspecified body region, initial encounter  Assessment and Plan: Patient provided with pre-operative instructions and verbalized understanding.  Patient can take Isosorbide nitrate, plavix Metoprolol, but otherwise will be NPO on day of surgery. Patient will stop NSAIDs, aspirin, herbal supplements or vitamins 1 week prior to surgery.  Chlorohexadine wash provided with instructions.  Pending cardiac clearance with directions for ASA and plavix as per surgeon. Covid swab pending per policy    Problem: MARTA (obstructive sleep apnea)  Assessment and Plan: no cpap use    Problem: CAD (coronary artery disease)  Assessment and Plan: continue medications

## 2021-08-09 NOTE — H&P PST ADULT - NSICDXFAMILYHX_GEN_ALL_CORE_FT
FAMILY HISTORY:  Father  Still living? Unknown  FH: melanoma, Age at diagnosis: Age Unknown    Mother  Still living? No  Family history of heart disease, Age at diagnosis: Age Unknown    Grandparent  Still living? Unknown  FH: type 2 diabetes, Age at diagnosis: Age Unknown

## 2021-08-09 NOTE — H&P PST ADULT - HISTORY OF PRESENT ILLNESS
This is a 66 year old male with pre-surgical diagnosis of foreign body in left index finger.  Patient was changing light bulbs, a piece of glass went into his finger.  PMHx HLD, CAD (CABG x 5 stents), BPH, MARTA, anxiety/depression.  Denies cpap use.  Otherwise feels well today.

## 2021-08-09 NOTE — H&P PST ADULT - NEGATIVE CARDIOVASCULAR SYMPTOMS
Ventricular Rate : 73   Atrial Rate : 73   P-R Interval : 152   QRS Duration : 86   Q-T Interval : 362   QTC Calculation(Bezet) : 398   P Axis : 67   R Axis : 35   T Axis : 62   Diagnosis : Normal sinus rhythm~Normal ECG~No previous ECGs available~Confirmed by BURAK ERAZO PAUL ([id number]) on 10/22/2018 2:56:58 PM     
no chest pain/no palpitations/no dyspnea on exertion

## 2021-08-09 NOTE — H&P PST ADULT - NSICDXPASTMEDICALHX_GEN_ALL_CORE_FT
PAST MEDICAL HISTORY:  CAD (coronary artery disease)     HLD (hyperlipidemia)     MARTA (obstructive sleep apnea)     Other injury of unspecified body region, initial encounter     Stented coronary artery

## 2021-08-13 PROBLEM — T14.8XXA OTHER INJURY OF UNSPECIFIED BODY REGION, INITIAL ENCOUNTER: Chronic | Status: ACTIVE | Noted: 2021-08-09

## 2021-08-14 ENCOUNTER — APPOINTMENT (OUTPATIENT)
Dept: DISASTER EMERGENCY | Facility: CLINIC | Age: 66
End: 2021-08-14

## 2021-08-14 DIAGNOSIS — Z01.818 ENCOUNTER FOR OTHER PREPROCEDURAL EXAMINATION: ICD-10-CM

## 2021-08-15 LAB — SARS-COV-2 N GENE NPH QL NAA+PROBE: NOT DETECTED

## 2021-08-16 ENCOUNTER — TRANSCRIPTION ENCOUNTER (OUTPATIENT)
Age: 66
End: 2021-08-16

## 2021-08-17 ENCOUNTER — OUTPATIENT (OUTPATIENT)
Dept: OUTPATIENT SERVICES | Facility: HOSPITAL | Age: 66
LOS: 1 days | End: 2021-08-17
Payer: MEDICARE

## 2021-08-17 ENCOUNTER — RESULT REVIEW (OUTPATIENT)
Age: 66
End: 2021-08-17

## 2021-08-17 ENCOUNTER — APPOINTMENT (OUTPATIENT)
Dept: ORTHOPEDIC SURGERY | Facility: HOSPITAL | Age: 66
End: 2021-08-17

## 2021-08-17 VITALS
RESPIRATION RATE: 16 BRPM | SYSTOLIC BLOOD PRESSURE: 98 MMHG | OXYGEN SATURATION: 96 % | TEMPERATURE: 98 F | HEART RATE: 59 BPM | DIASTOLIC BLOOD PRESSURE: 62 MMHG

## 2021-08-17 VITALS
RESPIRATION RATE: 14 BRPM | SYSTOLIC BLOOD PRESSURE: 139 MMHG | WEIGHT: 213.85 LBS | TEMPERATURE: 98 F | OXYGEN SATURATION: 98 % | HEIGHT: 67.5 IN | HEART RATE: 71 BPM | DIASTOLIC BLOOD PRESSURE: 74 MMHG

## 2021-08-17 DIAGNOSIS — T14.8XXA OTHER INJURY OF UNSPECIFIED BODY REGION, INITIAL ENCOUNTER: ICD-10-CM

## 2021-08-17 DIAGNOSIS — Z95.1 PRESENCE OF AORTOCORONARY BYPASS GRAFT: Chronic | ICD-10-CM

## 2021-08-17 PROCEDURE — 88300 SURGICAL PATH GROSS: CPT | Mod: 26

## 2021-08-17 PROCEDURE — 10121 INC&RMVL FB SUBQ TISS COMP: CPT | Mod: LT

## 2021-08-17 PROCEDURE — 10120 INC&RMVL FB SUBQ TISS SMPL: CPT

## 2021-08-17 PROCEDURE — 76000 FLUOROSCOPY <1 HR PHYS/QHP: CPT

## 2021-08-17 PROCEDURE — 88300 SURGICAL PATH GROSS: CPT

## 2021-08-17 RX ORDER — TAMSULOSIN HYDROCHLORIDE 0.4 MG/1
1 CAPSULE ORAL
Qty: 0 | Refills: 0 | DISCHARGE

## 2021-08-17 RX ORDER — CLONAZEPAM 1 MG
1 TABLET ORAL
Qty: 0 | Refills: 0 | DISCHARGE

## 2021-08-17 RX ORDER — ASPIRIN/CALCIUM CARB/MAGNESIUM 324 MG
1 TABLET ORAL
Qty: 0 | Refills: 0 | DISCHARGE

## 2021-08-17 RX ORDER — EZETIMIBE 10 MG/1
1 TABLET ORAL
Qty: 0 | Refills: 0 | DISCHARGE

## 2021-08-17 RX ORDER — CHOLECALCIFEROL (VITAMIN D3) 125 MCG
1 CAPSULE ORAL
Qty: 0 | Refills: 0 | DISCHARGE

## 2021-08-17 RX ORDER — METOPROLOL TARTRATE 50 MG
1 TABLET ORAL
Qty: 0 | Refills: 0 | DISCHARGE

## 2021-08-17 RX ORDER — FOLIC ACID 0.8 MG
1 TABLET ORAL
Qty: 0 | Refills: 0 | DISCHARGE

## 2021-08-17 RX ORDER — SODIUM CHLORIDE 9 MG/ML
1000 INJECTION, SOLUTION INTRAVENOUS
Refills: 0 | Status: DISCONTINUED | OUTPATIENT
Start: 2021-08-17 | End: 2021-08-17

## 2021-08-17 RX ORDER — ISOSORBIDE MONONITRATE 60 MG/1
1 TABLET, EXTENDED RELEASE ORAL
Qty: 0 | Refills: 0 | DISCHARGE

## 2021-08-17 RX ORDER — PREGABALIN 225 MG/1
0 CAPSULE ORAL
Qty: 0 | Refills: 0 | DISCHARGE

## 2021-08-17 RX ORDER — HYDROMORPHONE HYDROCHLORIDE 2 MG/ML
0.5 INJECTION INTRAMUSCULAR; INTRAVENOUS; SUBCUTANEOUS
Refills: 0 | Status: DISCONTINUED | OUTPATIENT
Start: 2021-08-17 | End: 2021-08-17

## 2021-08-17 RX ORDER — OXYCODONE HYDROCHLORIDE 5 MG/1
1 TABLET ORAL
Qty: 5 | Refills: 0
Start: 2021-08-17

## 2021-08-17 RX ORDER — CLOPIDOGREL BISULFATE 75 MG/1
1 TABLET, FILM COATED ORAL
Qty: 0 | Refills: 0 | DISCHARGE

## 2021-08-17 RX ORDER — OXYCODONE HYDROCHLORIDE 5 MG/1
5 TABLET ORAL EVERY 6 HOURS
Refills: 0 | Status: DISCONTINUED | OUTPATIENT
Start: 2021-08-17 | End: 2021-08-17

## 2021-08-17 RX ORDER — UBIDECARENONE 100 MG
1 CAPSULE ORAL
Qty: 0 | Refills: 0 | DISCHARGE

## 2021-08-17 RX ORDER — OMEGA-3 ACID ETHYL ESTERS 1 G
1 CAPSULE ORAL
Qty: 0 | Refills: 0 | DISCHARGE

## 2021-08-17 RX ADMIN — SODIUM CHLORIDE 50 MILLILITER(S): 9 INJECTION, SOLUTION INTRAVENOUS at 11:43

## 2021-08-17 NOTE — ASU DISCHARGE PLAN (ADULT/PEDIATRIC) - NURSING INSTRUCTIONS
All discharge information reviewed with patient including pain, safety, medication and follow up care. Pt acknowledges understanding of discharge instructions

## 2021-08-17 NOTE — ASU PATIENT PROFILE, ADULT - NS TRANSFER PATIENT BELONGINGS
credit card/None/Cell Phone/PDA (specify)/Other belongings/Clothing credit cards/None/Cell Phone/PDA (specify)/Other belongings/Clothing

## 2021-08-17 NOTE — ASU DISCHARGE PLAN (ADULT/PEDIATRIC) - ASU DC SPECIAL INSTRUCTIONSFT
WEAR THE SLING FOR COMFORT    DO NOT GET DRESSING WET, YOU MAY SHOWER, COVER DRESSING WITH A PLASTIC BAG    TAKE OXYCODONE FOR SEVERE PAIN, OTHERWISE TAKE EXTRA STRENGTH TYLENOL     DO NOT DRIVE WHILE TAKING OXYCODONE    NO HEAVY LIFTING    FOLLOW UP WITH DR. CLEANING IN 1 WEEK, PLEASE CALL THE OFFICE FOR AN APPOINTMENT

## 2021-08-17 NOTE — BRIEF OPERATIVE NOTE - NSICDXBRIEFPREOP_GEN_ALL_CORE_FT
PRE-OP DIAGNOSIS:  Retained foreign body of hand 17-Aug-2021 15:39:42 left index finger Lucia Mancini

## 2021-08-17 NOTE — BRIEF OPERATIVE NOTE - NSICDXBRIEFPROCEDURE_GEN_ALL_CORE_FT
PROCEDURES:  Removal of foreign body from left hand 17-Aug-2021 15:38:35 left index finger Lucia Mancini

## 2021-08-17 NOTE — BRIEF OPERATIVE NOTE - NSICDXBRIEFPOSTOP_GEN_ALL_CORE_FT
POST-OP DIAGNOSIS:  Retained foreign body of hand 17-Aug-2021 15:40:00 left index finger Lucia Mancini

## 2021-08-17 NOTE — ASU DISCHARGE PLAN (ADULT/PEDIATRIC) - CARE PROVIDER_API CALL
Andrei Tiwari)  Orthopaedic Surgery  [address]  [place] [address]  Phone: [phone]  Fax: [phone]  Follow Up Time:

## 2021-08-18 ENCOUNTER — RX RENEWAL (OUTPATIENT)
Age: 66
End: 2021-08-18

## 2021-08-23 LAB — SURGICAL PATHOLOGY STUDY: SIGNIFICANT CHANGE UP

## 2021-08-26 ENCOUNTER — APPOINTMENT (OUTPATIENT)
Dept: ORTHOPEDIC SURGERY | Facility: CLINIC | Age: 66
End: 2021-08-26
Payer: MEDICARE

## 2021-08-26 PROCEDURE — 99024 POSTOP FOLLOW-UP VISIT: CPT

## 2021-08-26 RX ORDER — CEFADROXIL 500 MG/1
500 CAPSULE ORAL TWICE DAILY
Qty: 28 | Refills: 0 | Status: ACTIVE | COMMUNITY
Start: 2021-08-26 | End: 1900-01-01

## 2021-08-26 NOTE — END OF VISIT
[FreeTextEntry3] : All medical record entries made by the Scribe were at my,  Dr. Andrei Tiwari MD., direction and personally dictated by me on 08/26/2021. I have personally reviewed the chart and agree that the record accurately reflects my personal performance of the history, physical exam, assessment and plan.

## 2021-08-26 NOTE — HISTORY OF PRESENT ILLNESS
[de-identified] : s/p Left index finger dissection, exploration, and removal of two fragments of glass [de-identified] : The patient is a 66 year male who returns for the 1st postoperative visit after undergoing left index finger dissection, exploration, and removal of two fragments of glass at [facility]. The surgery was on 08/17/2021. The patient is recovering at home. He reports mild postoperative pain.  [de-identified] : Patient is WDWN, alert, and in no acute distress. Breathing is unlabored. He is grossly oriented to person, place, and time.\par \par Incision is healing well. There are no signs of infection. Sutures were removed. Normal amount of postoperative edema and tenderness present. Sensation is normal to the finger tip. [de-identified] : no imaging today [de-identified] : Sutures removed. \par He was advised to use Vitamin E oil on the scar.\par Encouraged of gentle ROM exercises\par Rx: Duricef 500mg, BID (7 days supply).\par Follow up as needed.

## 2021-08-26 NOTE — ADDENDUM
[FreeTextEntry1] : I, Cynthia Tam wrote this note acting as a scribe for Dr. Andrei Tiwari on Aug 26, 2021.

## 2021-09-02 ENCOUNTER — APPOINTMENT (OUTPATIENT)
Dept: ORTHOPEDIC SURGERY | Facility: CLINIC | Age: 66
End: 2021-09-02
Payer: MEDICARE

## 2021-09-02 PROCEDURE — 99212 OFFICE O/P EST SF 10 MIN: CPT

## 2021-09-03 NOTE — END OF VISIT
[FreeTextEntry3] : All medical record entries made by the Scribe were at my,  Dr. Andrei Tiwari MD., direction and personally dictated by me on 09/02/2021. I have personally reviewed the chart and agree that the record accurately reflects my personal performance of the history, physical exam, assessment and plan.

## 2021-09-03 NOTE — ADDENDUM
[FreeTextEntry1] : I, Cynthia Tam wrote this note acting as a scribe for Dr. Andrei Tiwari on Sep 02, 2021.

## 2021-09-03 NOTE — HISTORY OF PRESENT ILLNESS
[de-identified] : s/p Left index finger dissection, exploration, and removal of two fragments of glass [de-identified] : The patient is a 66 year male who returns for the 2nd postoperative visit after undergoing left index finger dissection, exploration, and removal of two fragments of glass at [facility]. The surgery was on 08/17/2021 He returns on 9/2/21 complaining of a remaining suture along the incision site.  [de-identified] : Patient is WDWN, alert, and in no acute distress. Breathing is unlabored. He is grossly oriented to person, place, and time.\par \par Incision is healing well. There are no signs of infection. Suture was removed. Normal amount of postoperative edema and tenderness present. Sensation is normal to the finger tip. [de-identified] : no imaging today [de-identified] : Suture removed. \par He was advised to use Vitamin E oil for scar massage and desensitization.\par Encouraged of gentle ROM exercises\par Follow up as needed.

## 2021-09-12 ENCOUNTER — RX RENEWAL (OUTPATIENT)
Age: 66
End: 2021-09-12

## 2021-09-18 ENCOUNTER — RX RENEWAL (OUTPATIENT)
Age: 66
End: 2021-09-18

## 2021-10-04 ENCOUNTER — RX RENEWAL (OUTPATIENT)
Age: 66
End: 2021-10-04

## 2021-10-04 RX ORDER — TAMSULOSIN HYDROCHLORIDE 0.4 MG/1
0.4 CAPSULE ORAL
Qty: 180 | Refills: 0 | Status: ACTIVE | COMMUNITY
Start: 2018-01-10 | End: 1900-01-01

## 2021-10-05 ENCOUNTER — APPOINTMENT (OUTPATIENT)
Dept: ORTHOPEDIC SURGERY | Facility: CLINIC | Age: 66
End: 2021-10-05
Payer: MEDICARE
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
## 2021-10-05 DIAGNOSIS — T14.8XXA OTHER INJURY OF UNSPECIFIED BODY REGION, INITIAL ENCOUNTER: ICD-10-CM

## 2021-10-05 PROCEDURE — 99212 OFFICE O/P EST SF 10 MIN: CPT

## 2021-10-05 NOTE — END OF VISIT
[FreeTextEntry3] : All medical record entries made by the Scribe were at my,  Dr. Andrei Tiwari MD., direction and personally dictated by me on 10/05/2021. I have personally reviewed the chart and agree that the record accurately reflects my personal performance of the history, physical exam, assessment and plan.

## 2021-10-05 NOTE — ADDENDUM
[FreeTextEntry1] : I, Cynthia Tam wrote this note acting as a scribe for Dr. Andrei Tiwari on Oct 05, 2021.
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
## 2022-02-05 ENCOUNTER — RX RENEWAL (OUTPATIENT)
Age: 67
End: 2022-02-05

## 2022-03-05 ENCOUNTER — RX RENEWAL (OUTPATIENT)
Age: 67
End: 2022-03-05

## 2022-03-25 ENCOUNTER — RX RENEWAL (OUTPATIENT)
Age: 67
End: 2022-03-25

## 2022-07-30 ENCOUNTER — RX RENEWAL (OUTPATIENT)
Age: 67
End: 2022-07-30

## 2022-08-08 ENCOUNTER — RX RENEWAL (OUTPATIENT)
Age: 67
End: 2022-08-08

## 2022-08-08 RX ORDER — METOPROLOL TARTRATE 50 MG/1
50 TABLET, FILM COATED ORAL DAILY
Qty: 90 | Refills: 2 | Status: ACTIVE | COMMUNITY
Start: 2018-01-10 | End: 1900-01-01

## 2022-08-30 ENCOUNTER — RX RENEWAL (OUTPATIENT)
Age: 67
End: 2022-08-30

## 2022-08-30 RX ORDER — EZETIMIBE 10 MG/1
10 TABLET ORAL
Qty: 90 | Refills: 1 | Status: ACTIVE | COMMUNITY
Start: 2018-01-10 | End: 1900-01-01

## 2022-09-13 ENCOUNTER — RX RENEWAL (OUTPATIENT)
Age: 67
End: 2022-09-13

## 2022-09-13 RX ORDER — ISOSORBIDE MONONITRATE 30 MG/1
30 TABLET, EXTENDED RELEASE ORAL
Qty: 90 | Refills: 1 | Status: ACTIVE | COMMUNITY
Start: 2018-07-31 | End: 1900-01-01

## 2022-09-27 NOTE — H&P PST ADULT - TOBACCO USE
Pt arrived to [place] [id number] via stretcher w/ transporter, AAO x4, anem//allergies/procedure verified.   Never smoker

## 2022-12-04 ENCOUNTER — EMERGENCY (EMERGENCY)
Facility: HOSPITAL | Age: 67
LOS: 1 days | Discharge: HOME | End: 2022-12-04
Attending: EMERGENCY MEDICINE | Admitting: EMERGENCY MEDICINE
Payer: MEDICARE

## 2022-12-04 VITALS
WEIGHT: 203.05 LBS | RESPIRATION RATE: 18 BRPM | HEIGHT: 67 IN | DIASTOLIC BLOOD PRESSURE: 76 MMHG | TEMPERATURE: 98 F | OXYGEN SATURATION: 97 % | SYSTOLIC BLOOD PRESSURE: 137 MMHG | HEART RATE: 65 BPM

## 2022-12-04 DIAGNOSIS — Z95.1 PRESENCE OF AORTOCORONARY BYPASS GRAFT: Chronic | ICD-10-CM

## 2022-12-04 PROCEDURE — 99283 EMERGENCY DEPT VISIT LOW MDM: CPT

## 2022-12-04 RX ORDER — CEPHALEXIN 500 MG
1 CAPSULE ORAL
Qty: 15 | Refills: 0
Start: 2022-12-04 | End: 2022-12-08

## 2022-12-04 RX ORDER — CEPHALEXIN 500 MG
500 CAPSULE ORAL ONCE
Refills: 0 | Status: COMPLETED | OUTPATIENT
Start: 2022-12-04 | End: 2022-12-04

## 2022-12-04 RX ADMIN — Medication 500 MILLIGRAM(S): at 23:49

## 2022-12-04 NOTE — ED PROVIDER NOTE - OBJECTIVE STATEMENT
67-year-old male complaining of laceration mild to left pinky at about 3 PM today accidentally cut it with a razor blade.  Associated with mild bleeding. no weakness numbness.  No fever.  Tetanus vaccination up-to-date

## 2022-12-04 NOTE — ED PROVIDER NOTE - CLINICAL SUMMARY MEDICAL DECISION MAKING FREE TEXT BOX
67-year-old male with left fifth finger laceration 3 PM today with mild swelling since.  Bleeding under control.  Wound irrigated with normal saline.  Steri-Strip applied. bulky dressing applied to prevent flexion of PIP.  Will start Keflex for wound infection prophylaxis is finger mildly swollen and lac occurred earlier in the day.

## 2022-12-04 NOTE — ED PROVIDER NOTE - PATIENT PORTAL LINK FT
You can access the FollowMyHealth Patient Portal offered by [facility] by registering at the following website: [email or web address]. By joining [facility]’s FollowMyHealth portal, you will also be able to view your health information using other applications (apps) compatible with our system.

## 2022-12-04 NOTE — ED ADULT TRIAGE NOTE - TEMPERATURE IN FAHRENHEIT (DEGREES F)
----- Message from RONA Duarte sent at 2021  7:01 AM CDT -----  Tsh- wnl  Vitamin D- wnl  CMP- AST/ALT both elevated but has improved since last labs 7 months ago. Continue to watch diet closely- repeat in 3 months  Lipid panel- trigs slightly elevated, improved from last labs.- continue to watch diet  Hgba1c- stable at 5.2  Urinalysis- 1+ blood- repeat in 1 month  CBC- wnl      Contacted pt, verified name and . Informed of above results and recommendations per provider. Pt vu of all and agreed. Pt states she is currently seeing urologist that is aware of hematuria and is following up with pt on this and monitoring.   
98

## 2022-12-04 NOTE — ED PROVIDER NOTE - NSFOLLOWUPINSTRUCTIONS_ED_ALL_ED_FT
- take tylenol or motrin for pain  - take keflex antibiotic    keep wound dry for 24 hrs.  then you can wash wound with soap and water, pat dry and apply bacitracin and bandage daily    Laceration    WHAT YOU NEED TO KNOW:    A laceration is an injury to the skin and the soft tissue underneath it. Lacerations can happen anywhere on the body.    DISCHARGE INSTRUCTIONS:    Return to the emergency department if:     You have heavy bleeding or bleeding that does not stop after 10 minutes of holding firm, direct pressure over the wound.    Your wound opens up.    Call your doctor if:     You have a fever or chills.    Your laceration is red, warm, or swollen.    You have red streaks on your skin coming from your wound.    You have white or yellow drainage from the wound that smells bad.    You have pain that gets worse, even after treatment.    You have questions or concerns about your condition or care.    Medicines: You may need any of the following:     Prescription pain medicine may be given. Ask your healthcare provider how to take this medicine safely. Some prescription pain medicines contain acetaminophen. Do not take other medicines that contain acetaminophen without talking to your healthcare provider. Too much acetaminophen may cause liver damage. Prescription pain medicine may cause constipation. Ask your healthcare provider how to prevent or treat constipation.     Antibiotics help treat or prevent a bacterial infection.    Take your medicine as directed. Contact your healthcare provider if you think your medicine is not helping or if you have side effects. Tell him or her if you are allergic to any medicine. Keep a list of the medicines, vitamins, and herbs you take. Include the amounts, and when and why you take them. Bring the list or the pill bottles to follow-up visits. Carry your medicine list with you in case of an emergency.    Care for your wound as directed:     Do not get your wound wet until your healthcare provider says it is okay. Do not soak your wound in water. Do not go swimming until your healthcare provider says it is okay. Carefully wash the wound with soap and water. Gently pat the area dry or allow it to air dry.    Change your bandages when they get wet, dirty, or after washing. Apply new, clean bandages as directed. Do not apply elastic bandages or tape too tight. Do not put powders or lotions over your incision.    Apply antibiotic ointment as directed. Your healthcare provider may give you antibiotic ointment to put over your wound if you have stitches. If you have strips of tape over your incision, let them dry up and fall off on their own. If they do not fall off within 14 days, gently remove them. If you have glue over your wound, do not remove or pick at it. If your glue comes off, do not replace it with glue that you have at home.    Check your wound every day for signs of infection, such as swelling, redness, or pus.    Self-care:     Apply ice on your wound for 15 to 20 minutes every hour or as directed. Use an ice pack, or put crushed ice in a plastic bag. Cover it with a towel. Ice helps prevent tissue damage and decreases swelling and pain.    Use a splint as directed. A splint will decrease movement and stress on your wound. It may help it heal faster. A splint may be used for lacerations over joints or areas of your body that bend. Ask your healthcare provider how to apply and remove a splint.    Decrease scarring of your wound by applying ointments as directed. Do not apply ointments until your healthcare provider says it is okay. You may need to wait until your wound is healed. Ask which ointment to buy and how often to use it. After your wound is healed, use sunscreen over the area when you are out in the sun. You should do this for at least 6 months to 1 year after your injury.    Follow up with your doctor as directed: You may need to follow up in 24 to 48 hours to have your wound checked for infection. You will need to return in 3 to 14 days if you have stitches or staples so they can be removed. Care for your wound as directed to prevent infection and help it heal. Write down your questions so you remember to ask them during your visits.

## 2022-12-04 NOTE — ED PROVIDER NOTE - SKIN WOUND DESCRIPTION
L 5th finger u shaped tangential lac over PIP area~ 1.3cm total length. mild generalized swelling of area. no erythema or dc. no active bleeding. flexes and extendes fully at pip/dip c full strength.

## 2022-12-11 NOTE — CHART NOTE - NSCHARTNOTEFT_GEN_A_CORE
SW called pt to discuss and assist with follow up care.  Pt is a 68 y/o male presented to ED for laceration.  Pt has not responded to the call, left message, encouraged pt to call SW if further assistance is needed.

## 2023-01-06 NOTE — ASSESSMENT
[FreeTextEntry1] : Physical exam shows a well-developed male in no acute distress blood pressure was 118/80 repeated 110/70 height 5 foot 7 weight 203 pounds heart rate 64 respirations 16 HEENT was unremarkable chest was clear cardiovascular was regular abdomen was soft neuro nonfocal patient's cardiologist is Dr. Chase Lozano is scheduled to see in 2 weeks time patient needs a colonoscopy scheduled he will discuss with his cardiologist how he would like to work the Plavix and the aspirin colonoscopy can be done if necessary on these medicines is up-to-date with his ophthalmologist as do a see a dermatologist medications were discussed and reviewed urologist was suggested in view of the significant nocturia . The shingles vaccine was recommended to him and he will look into it with his present insurance he is switching over to Medicare on March 5 physical therapy/social work

## 2023-01-25 ENCOUNTER — RX RENEWAL (OUTPATIENT)
Age: 68
End: 2023-01-25

## 2023-01-25 RX ORDER — PRAVASTATIN SODIUM 40 MG/1
40 TABLET ORAL
Qty: 90 | Refills: 1 | Status: ACTIVE | COMMUNITY
Start: 2018-01-10 | End: 1900-01-01

## 2023-01-31 ENCOUNTER — APPOINTMENT (OUTPATIENT)
Dept: ORTHOPEDIC SURGERY | Facility: CLINIC | Age: 68
End: 2023-01-31
Payer: MEDICARE

## 2023-01-31 PROCEDURE — 99213 OFFICE O/P EST LOW 20 MIN: CPT | Mod: 25

## 2023-01-31 PROCEDURE — 73130 X-RAY EXAM OF HAND: CPT | Mod: LT

## 2023-01-31 PROCEDURE — 29130 APPL FINGER SPLINT STATIC: CPT | Mod: F4

## 2023-01-31 NOTE — ADDENDUM
[FreeTextEntry1] : I, Cynthia Tam wrote this note acting as a scribe for Dr. Andrei Tiwari on Jan 31, 2023.

## 2023-01-31 NOTE — END OF VISIT
[FreeTextEntry3] : All medical record entries made by the Scribe were at my,  Dr. Andrei Tiwari MD., direction and personally dictated by me on 01/31/2023. I have personally reviewed the chart and agree that the record accurately reflects my personal performance of the history, physical exam, assessment and plan.

## 2023-01-31 NOTE — DISCUSSION/SUMMARY
[de-identified] : The underlying pathophysiology was reviewed with the patient. XR films were reviewed with the patient. Discussed at length the nature of the patient’s condition.\par \par At this time, I discussed the nature of the injury with the patient. 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 I recommended first trying nonoperative treatment with full time splinting for 6 weeks. He was placed into a dorsal static splint with the PIP joint in forced extension. He was instructed on proper splint care and to keep the splint dry. He understands that even with the correction, he may not regain full motion and function at the PIP joint. I recommended ROM exercises of the other digits while splinted. \par \par All questions answered, understanding verbalized. Patient in agreement with plan of care. Follow up in 3 weeks for reassessment.

## 2023-01-31 NOTE — HISTORY OF PRESENT ILLNESS
[de-identified] : Pt is a 68 y/o male with complaints of left index finger pain. He underwent a left index finger dissection, exploration and removal of two glass fragments of glass on 8/17/2021. He states he had a friend of his in the medical field remove the sutures but he believes there is a residual sutures along the ulnar aspect of the finger.\par \par He additionally complains of a left little finger injury which occurred in either of November or December of 2022. He notes he lacerated the dorsal aspect of the left little finger along the PIP joint when trying to get a tie off of a package. He notes he did seek treatment at a local ER and was told by the treating care provider that nothing was of concern and he states he was sent home. 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

## 2023-01-31 NOTE — PHYSICAL EXAM
[de-identified] : Patient is WDWN, alert, and in no acute distress. Breathing is unlabored. 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 No acute fracture. No dislocation. Cartilage spaces are maintained.

## 2023-02-21 ENCOUNTER — APPOINTMENT (OUTPATIENT)
Dept: ORTHOPEDIC SURGERY | Facility: CLINIC | Age: 68
End: 2023-02-21
Payer: MEDICARE

## 2023-02-21 PROCEDURE — 29130 APPL FINGER SPLINT STATIC: CPT

## 2023-02-21 PROCEDURE — 99213 OFFICE O/P EST LOW 20 MIN: CPT | Mod: 25

## 2023-02-21 NOTE — HISTORY OF PRESENT ILLNESS
[de-identified] : Pt is a 68 y/o male with a left little finger injury which occurred in either of November or December of 2022. He notes he lacerated the dorsal aspect of the left little finger along the PIP joint when trying to get a tie off of a package. He notes he did seek treatment at a local ER and was told by the treating care provider that nothing was of concern and he states he was sent home. 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 He was seen in the office on 1/31/23 at which time he was instructed on splinting for 6 weeks. He returns on 2/21/23 for reassessment. He notes he remained in the splint for about two weeks but he did change the splint. He states he did develop a rash and had to remove the splint full time. Overall, he notes to change in the positioning of the digit.

## 2023-02-21 NOTE — ADDENDUM
[FreeTextEntry1] : I, Cynthia Tam wrote this note acting as a scribe for Dr. Andrei Tiwari on Feb 21, 2023.

## 2023-02-21 NOTE — DISCUSSION/SUMMARY
[de-identified] : The underlying pathophysiology was reviewed with the patient. XR films were reviewed with the patient. Discussed at length the nature of the patient’s condition. \par \par At this time, I again discussed the nature of the injury with the patient. I did tell him that he likely lacerated a tendon resulting in the boutonniere deformity. I did tell him once again that I would prefer to treat with nonoperatively as I do feel the outcome will be better overall than undergoing closed pinning. He was fitted with a dynamic extension splint which was reinforced with Coban for support while in the office today to be worn continuously for the next 6 weeks. He understands that if he removes the splint, the treatment will have to be restarted from the beginning. He also understands that even with the correction, he may not regain full motion and function at the PIP joint.\par \par All questions answered, understanding verbalized. Patient in agreement with plan of care. Follow up in 6 weeks.

## 2023-02-21 NOTE — END OF VISIT
[FreeTextEntry3] : All medical record entries made by the Scribe were at my,  Dr. Andrei Tiwari MD., direction and personally dictated by me on 02/21/2023. I have personally reviewed the chart and agree that the record accurately reflects my personal performance of the history, physical exam, assessment and plan.

## 2023-02-21 NOTE — PHYSICAL EXAM
[de-identified] : Patient is WDWN, alert, and in no acute distress. Breathing is unlabored. 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

## 2023-02-25 ENCOUNTER — RX RENEWAL (OUTPATIENT)
Age: 68
End: 2023-02-25

## 2023-02-25 RX ORDER — CLOPIDOGREL BISULFATE 75 MG/1
75 TABLET, FILM COATED ORAL
Qty: 90 | Refills: 1 | Status: ACTIVE | COMMUNITY
Start: 2018-02-07 | End: 1900-01-01

## 2023-04-04 ENCOUNTER — APPOINTMENT (OUTPATIENT)
Dept: ORTHOPEDIC SURGERY | Facility: CLINIC | Age: 68
End: 2023-04-04
Payer: MEDICARE

## 2023-04-04 DIAGNOSIS — M20.022 BOUTONNIERE DEFORMITY OF LEFT FINGER(S): ICD-10-CM

## 2023-04-04 PROCEDURE — 99213 OFFICE O/P EST LOW 20 MIN: CPT | Mod: 25

## 2023-04-04 PROCEDURE — 29130 APPL FINGER SPLINT STATIC: CPT

## 2023-04-04 NOTE — DISCUSSION/SUMMARY
[de-identified] : The underlying pathophysiology was reviewed with the patient. XR films were reviewed with the patient. Discussed at length the nature of the patient’s condition. \par \par At this time, I again discussed the nature of the injury with the patient. I did tell him that he likely lacerated a tendon resulting in the boutonniere deformity. He was instructed to continue with use of the dynamic extension splint which was reinforced with Coban for support. He was advised to wear the splint at night. He understands that even with the correction, he may not regain full motion and function at the PIP joint.\par \par All questions answered, understanding verbalized. Patient in agreement with plan of care.

## 2023-04-04 NOTE — ADDENDUM
[FreeTextEntry1] : I, Cynthia Tam wrote this note acting as a scribe for Dr. Andrei Tiwari on Apr 04, 2023.

## 2023-04-04 NOTE — HISTORY OF PRESENT ILLNESS
[de-identified] : Pt is a 68 y/o male with a left little finger injury which occurred in either of November or December of 2022. He notes he lacerated the dorsal aspect of the left little finger along the PIP joint when trying to get a tie off of a package. He notes he did seek treatment at a local ER and was told by the treating care provider that nothing was of concern and he states he was sent home. 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 He was seen in the office on 1/31/23 at which time he was instructed on splinting for 6 weeks. He returned on 2/21/23 and noted remained in the splint for about two weeks but he did change the splint. He states he developed a rash and had to remove the splint full time. Overall, he notes to change in the positioning of the digit. He returns on 4/4/23 and notes with flexion the finger feels difficult and swollen. He has pain with flexion notably at the PIP joint. He has remained in the dynamic splint. He notes he was unable to use the static splint as it was very uncomfortable and cut off his circulation.

## 2023-04-04 NOTE — END OF VISIT
[FreeTextEntry3] : All medical record entries made by the Scribe were at my,  Dr. Andrei Tiwari MD., direction and personally dictated by me on 04/04/2023. I have personally reviewed the chart and agree that the record accurately reflects my personal performance of the history, physical exam, assessment and plan.

## 2024-03-08 NOTE — ED PROVIDER NOTE - CPE EDP NEURO NORM
atorvastatin (LIPITOR) 10 MG tablet 90 tablet 1 12/20/2023       Should have refills on file. Pharmacy sent message. Rx refill denied    Betsey Felton RN  P Red Flag Triage/MRT     
normal...
